# Patient Record
Sex: MALE | Race: WHITE | NOT HISPANIC OR LATINO | Employment: OTHER | URBAN - METROPOLITAN AREA
[De-identification: names, ages, dates, MRNs, and addresses within clinical notes are randomized per-mention and may not be internally consistent; named-entity substitution may affect disease eponyms.]

---

## 2023-02-13 PROBLEM — I35.9 CALCIFICATION OF AORTIC VALVE: Status: ACTIVE | Noted: 2023-02-13

## 2023-02-13 PROBLEM — E11.9 DIABETES MELLITUS TYPE 2, UNCOMPLICATED (MULTI): Status: ACTIVE | Noted: 2023-02-13

## 2023-02-13 PROBLEM — E66.3 OVERWEIGHT WITH BODY MASS INDEX (BMI) OF 29 TO 29.9 IN ADULT: Status: ACTIVE | Noted: 2023-02-13

## 2023-02-13 PROBLEM — Z86.69 HISTORY OF CATARACT: Status: ACTIVE | Noted: 2023-02-13

## 2023-02-13 PROBLEM — I35.0 MILD AORTIC STENOSIS: Status: ACTIVE | Noted: 2023-02-13

## 2023-02-13 PROBLEM — G62.9 NEUROPATHY: Status: ACTIVE | Noted: 2023-02-13

## 2023-02-13 PROBLEM — N18.30 CKD STAGE 3 SECONDARY TO DIABETES (MULTI): Status: ACTIVE | Noted: 2023-02-13

## 2023-02-13 PROBLEM — E13.44: Status: ACTIVE | Noted: 2023-02-13

## 2023-02-13 PROBLEM — I10 BENIGN ESSENTIAL HYPERTENSION: Status: ACTIVE | Noted: 2023-02-13

## 2023-02-13 PROBLEM — E66.811 CLASS 1 OBESITY WITH BODY MASS INDEX (BMI) OF 31.0 TO 31.9 IN ADULT: Status: ACTIVE | Noted: 2023-02-13

## 2023-02-13 PROBLEM — E78.5 HYPERLIPIDEMIA: Status: ACTIVE | Noted: 2023-02-13

## 2023-02-13 PROBLEM — E11.42 DIABETIC SENSORIMOTOR NEUROPATHY (MULTI): Status: ACTIVE | Noted: 2023-02-13

## 2023-02-13 PROBLEM — E11.22 CKD STAGE 3 SECONDARY TO DIABETES (MULTI): Status: ACTIVE | Noted: 2023-02-13

## 2023-02-13 PROBLEM — E66.9 CLASS 1 OBESITY WITH BODY MASS INDEX (BMI) OF 31.0 TO 31.9 IN ADULT: Status: ACTIVE | Noted: 2023-02-13

## 2023-02-13 RX ORDER — AMLODIPINE BESYLATE 10 MG/1
1 TABLET ORAL DAILY
COMMUNITY
Start: 2016-02-19 | End: 2023-03-23 | Stop reason: SDUPTHER

## 2023-02-13 RX ORDER — HYDROCHLOROTHIAZIDE 25 MG/1
1 TABLET ORAL DAILY
COMMUNITY
Start: 2014-05-15 | End: 2023-03-23 | Stop reason: SDUPTHER

## 2023-02-13 RX ORDER — PEN NEEDLE, DIABETIC 30 GX3/16"
NEEDLE, DISPOSABLE MISCELLANEOUS
COMMUNITY

## 2023-02-13 RX ORDER — LOSARTAN POTASSIUM 100 MG/1
1 TABLET ORAL DAILY
COMMUNITY
Start: 2014-05-27 | End: 2023-03-23 | Stop reason: SDUPTHER

## 2023-02-13 RX ORDER — ACETAMINOPHEN 500 MG
1 TABLET ORAL DAILY
COMMUNITY

## 2023-02-13 RX ORDER — TAMSULOSIN HYDROCHLORIDE 0.4 MG/1
CAPSULE ORAL
COMMUNITY
Start: 2020-10-09

## 2023-02-13 RX ORDER — INSULIN GLARGINE 100 [IU]/ML
INJECTION, SOLUTION SUBCUTANEOUS
COMMUNITY
Start: 2014-05-16 | End: 2023-03-23 | Stop reason: SDUPTHER

## 2023-02-13 RX ORDER — ASPIRIN 81 MG/1
1 TABLET ORAL DAILY
COMMUNITY

## 2023-02-13 RX ORDER — BLOOD-GLUCOSE METER
EACH MISCELLANEOUS
COMMUNITY
Start: 2017-11-20 | End: 2023-03-23 | Stop reason: ALTCHOICE

## 2023-02-13 RX ORDER — INSULIN ASPART 100 [IU]/ML
INJECTION, SOLUTION INTRAVENOUS; SUBCUTANEOUS
COMMUNITY
Start: 2015-12-14

## 2023-02-13 RX ORDER — METFORMIN HYDROCHLORIDE 500 MG/1
1 TABLET ORAL
COMMUNITY
Start: 2020-03-12 | End: 2023-03-23 | Stop reason: SDUPTHER

## 2023-02-13 RX ORDER — ATORVASTATIN CALCIUM 40 MG/1
1 TABLET, FILM COATED ORAL NIGHTLY
COMMUNITY
Start: 2015-07-08 | End: 2023-03-23 | Stop reason: SDUPTHER

## 2023-02-13 RX ORDER — ATENOLOL 25 MG/1
1 TABLET ORAL 2 TIMES DAILY
COMMUNITY
Start: 2014-10-29 | End: 2023-03-23 | Stop reason: SDUPTHER

## 2023-03-23 ENCOUNTER — LAB (OUTPATIENT)
Dept: LAB | Facility: LAB | Age: 73
End: 2023-03-23
Payer: MEDICARE

## 2023-03-23 ENCOUNTER — OFFICE VISIT (OUTPATIENT)
Dept: PRIMARY CARE | Facility: CLINIC | Age: 73
End: 2023-03-23
Payer: MEDICARE

## 2023-03-23 VITALS
TEMPERATURE: 98 F | WEIGHT: 208 LBS | OXYGEN SATURATION: 96 % | BODY MASS INDEX: 31.4 KG/M2 | RESPIRATION RATE: 16 BRPM | DIASTOLIC BLOOD PRESSURE: 70 MMHG | SYSTOLIC BLOOD PRESSURE: 128 MMHG | HEART RATE: 72 BPM

## 2023-03-23 DIAGNOSIS — Z79.4 TYPE 2 DIABETES MELLITUS WITHOUT COMPLICATION, WITH LONG-TERM CURRENT USE OF INSULIN (MULTI): Primary | ICD-10-CM

## 2023-03-23 DIAGNOSIS — I35.0 MILD AORTIC STENOSIS: ICD-10-CM

## 2023-03-23 DIAGNOSIS — E11.9 TYPE 2 DIABETES MELLITUS WITHOUT COMPLICATION, WITH LONG-TERM CURRENT USE OF INSULIN (MULTI): Primary | ICD-10-CM

## 2023-03-23 DIAGNOSIS — N40.0 BENIGN PROSTATIC HYPERPLASIA WITHOUT LOWER URINARY TRACT SYMPTOMS: ICD-10-CM

## 2023-03-23 DIAGNOSIS — E11.22 CKD STAGE 3 SECONDARY TO DIABETES (MULTI): ICD-10-CM

## 2023-03-23 DIAGNOSIS — E66.9 CLASS 1 OBESITY WITH SERIOUS COMORBIDITY AND BODY MASS INDEX (BMI) OF 31.0 TO 31.9 IN ADULT, UNSPECIFIED OBESITY TYPE: ICD-10-CM

## 2023-03-23 DIAGNOSIS — E11.9 TYPE 2 DIABETES MELLITUS WITHOUT COMPLICATION, WITH LONG-TERM CURRENT USE OF INSULIN (MULTI): ICD-10-CM

## 2023-03-23 DIAGNOSIS — I35.9 CALCIFICATION OF AORTIC VALVE: ICD-10-CM

## 2023-03-23 DIAGNOSIS — I10 BENIGN ESSENTIAL HYPERTENSION: ICD-10-CM

## 2023-03-23 DIAGNOSIS — E11.42 DIABETIC SENSORIMOTOR NEUROPATHY (MULTI): ICD-10-CM

## 2023-03-23 DIAGNOSIS — N18.30 CKD STAGE 3 SECONDARY TO DIABETES (MULTI): ICD-10-CM

## 2023-03-23 DIAGNOSIS — E78.2 MIXED HYPERLIPIDEMIA: ICD-10-CM

## 2023-03-23 DIAGNOSIS — Z79.4 TYPE 2 DIABETES MELLITUS WITHOUT COMPLICATION, WITH LONG-TERM CURRENT USE OF INSULIN (MULTI): ICD-10-CM

## 2023-03-23 PROBLEM — G62.9 NEUROPATHY: Status: RESOLVED | Noted: 2023-02-13 | Resolved: 2023-03-23

## 2023-03-23 LAB
ABO GROUP (TYPE) IN BLOOD: NORMAL
ANTIBODY SCREEN: NORMAL
CHOLESTEROL (MG/DL) IN SER/PLAS: 123 MG/DL (ref 0–199)
CHOLESTEROL IN HDL (MG/DL) IN SER/PLAS: 43.2 MG/DL
CHOLESTEROL/HDL RATIO: 2.8
LDL: 62 MG/DL (ref 0–99)
PROSTATE SPECIFIC AG (NG/ML) IN SER/PLAS: 0.73 NG/ML (ref 0–4)
RH FACTOR: NORMAL
TRIGLYCERIDE (MG/DL) IN SER/PLAS: 87 MG/DL (ref 0–149)
VLDL: 17 MG/DL (ref 0–40)

## 2023-03-23 PROCEDURE — 99214 OFFICE O/P EST MOD 30 MIN: CPT | Performed by: INTERNAL MEDICINE

## 2023-03-23 PROCEDURE — 36415 COLL VENOUS BLD VENIPUNCTURE: CPT

## 2023-03-23 PROCEDURE — 4010F ACE/ARB THERAPY RXD/TAKEN: CPT | Performed by: INTERNAL MEDICINE

## 2023-03-23 PROCEDURE — 3008F BODY MASS INDEX DOCD: CPT | Performed by: INTERNAL MEDICINE

## 2023-03-23 PROCEDURE — 3066F NEPHROPATHY DOC TX: CPT | Performed by: INTERNAL MEDICINE

## 2023-03-23 PROCEDURE — 3074F SYST BP LT 130 MM HG: CPT | Performed by: INTERNAL MEDICINE

## 2023-03-23 PROCEDURE — 1036F TOBACCO NON-USER: CPT | Performed by: INTERNAL MEDICINE

## 2023-03-23 PROCEDURE — 84153 ASSAY OF PSA TOTAL: CPT

## 2023-03-23 PROCEDURE — 3078F DIAST BP <80 MM HG: CPT | Performed by: INTERNAL MEDICINE

## 2023-03-23 PROCEDURE — 80061 LIPID PANEL: CPT

## 2023-03-23 PROCEDURE — 83036 HEMOGLOBIN GLYCOSYLATED A1C: CPT | Performed by: INTERNAL MEDICINE

## 2023-03-23 PROCEDURE — 1159F MED LIST DOCD IN RCRD: CPT | Performed by: INTERNAL MEDICINE

## 2023-03-23 RX ORDER — METFORMIN HYDROCHLORIDE 500 MG/1
500 TABLET ORAL
Qty: 90 TABLET | Refills: 3 | Status: SHIPPED | OUTPATIENT
Start: 2023-03-23 | End: 2023-09-27 | Stop reason: ALTCHOICE

## 2023-03-23 RX ORDER — ATENOLOL 25 MG/1
25 TABLET ORAL 2 TIMES DAILY
Qty: 180 TABLET | Refills: 2 | Status: SHIPPED | OUTPATIENT
Start: 2023-03-23

## 2023-03-23 RX ORDER — AMLODIPINE BESYLATE 10 MG/1
10 TABLET ORAL DAILY
Qty: 90 TABLET | Refills: 3 | Status: SHIPPED | OUTPATIENT
Start: 2023-03-23

## 2023-03-23 RX ORDER — INSULIN GLARGINE 100 [IU]/ML
INJECTION, SOLUTION SUBCUTANEOUS
Qty: 3 ML | Refills: 2 | Status: SHIPPED | OUTPATIENT
Start: 2023-03-23

## 2023-03-23 RX ORDER — ATORVASTATIN CALCIUM 40 MG/1
40 TABLET, FILM COATED ORAL NIGHTLY
Qty: 90 TABLET | Refills: 3 | Status: SHIPPED | OUTPATIENT
Start: 2023-03-23

## 2023-03-23 RX ORDER — HYDROCHLOROTHIAZIDE 25 MG/1
25 TABLET ORAL DAILY
Qty: 90 TABLET | Refills: 3 | Status: SHIPPED | OUTPATIENT
Start: 2023-03-23 | End: 2024-04-03 | Stop reason: SDUPTHER

## 2023-03-23 RX ORDER — LOSARTAN POTASSIUM 100 MG/1
100 TABLET ORAL DAILY
Qty: 90 TABLET | Refills: 3 | Status: SHIPPED | OUTPATIENT
Start: 2023-03-23

## 2023-03-23 RX ORDER — BLOOD-GLUCOSE METER
1 EACH MISCELLANEOUS 3 TIMES DAILY
Qty: 270 STRIP | Refills: 3 | Status: SHIPPED | OUTPATIENT
Start: 2023-03-23 | End: 2023-03-23 | Stop reason: SDUPTHER

## 2023-03-23 RX ORDER — ALLOPURINOL 100 MG/1
100 TABLET ORAL DAILY
COMMUNITY

## 2023-03-23 ASSESSMENT — ENCOUNTER SYMPTOMS
BLOOD IN STOOL: 0
DIZZINESS: 0
LIGHT-HEADEDNESS: 0
DIARRHEA: 0
SHORTNESS OF BREATH: 0
CONSTIPATION: 0
NUMBNESS: 1

## 2023-03-23 ASSESSMENT — PATIENT HEALTH QUESTIONNAIRE - PHQ9
1. LITTLE INTEREST OR PLEASURE IN DOING THINGS: NOT AT ALL
SUM OF ALL RESPONSES TO PHQ9 QUESTIONS 1 AND 2: 0
2. FEELING DOWN, DEPRESSED OR HOPELESS: NOT AT ALL

## 2023-03-23 NOTE — PROGRESS NOTES
"Patient here for a follow up    Subjective   Patient ID: Shiva Hanson is a 72 y.o. male who presents for Follow-up.  He is generally doing well today.    The patient has been measuring his blood glucose at home and reports that average readings are within the normal range.  He has been working on limiting his carbohydrate intake and feels that this has been helping.  He has an upcoming appointment with  in 8/2023 to establish care.  There have been no changes in his medications.  He plans to complete his regular eye exam on 3/30/2023.      The patient mentions intermittent occasional lower limb peripheral neuropathy which he describes as a \"phantom pain\" in varying areas of t foot that subsides.    The patient reports a long-standing heart murmur since childhood, and inquires if this may be related to his being born premature.  His last echocardiogram in 1/2023 showed normal LVSF with EF 60-65%, moderate aortic valve stenosis, and moderate aortic valve cusp calcification. He denies any recent dizziness, lightheadedness, or dyspnea.    The patient recently completed a renal ultrasound as part of a chronic kidney disease work-up which found elevated postvoid bladder residual of 80 mL and slight wall trabeculation suggestive of bladder outlet obstruction.  He was started on tamsulosin 0.4 mg at bedtime and has not yet noticed a difference in his symptomlogy.  He continues to wake up about two times per night for mild nocturia.  His latest renal function panel was improved overall with a GFR of 45 in 1/2023.  He was also placed on allopurinol temporarily and is responding to the medication well.  The patient continues to follow with  from Nephrology and will have his next appointment in 8/2023.     The patient denies any abdominal pain, hematochezia, melena, or bowel problems.      Review of Systems   Respiratory:  Negative for shortness of breath.    Gastrointestinal:  Negative for blood in stool, " constipation and diarrhea.   Genitourinary:         Positive for mild nocturia of two times per night.   Neurological:  Positive for numbness. Negative for dizziness and light-headedness.        Positive for lower limb peripheral neuropathy.       Objective   Physical Exam  Constitutional:       Appearance: Normal appearance.   Cardiovascular:      Rate and Rhythm: Normal rate and regular rhythm.      Heart sounds: Murmur heard.   Pulmonary:      Effort: Pulmonary effort is normal.      Breath sounds: Normal breath sounds.   Abdominal:      General: Bowel sounds are normal.      Palpations: Abdomen is soft.      Tenderness: There is no abdominal tenderness.   Skin:     General: Skin is warm and dry.   Neurological:      General: No focal deficit present.      Mental Status: He is alert and oriented to person, place, and time. Mental status is at baseline.   Psychiatric:         Mood and Affect: Mood normal.         Behavior: Behavior normal.       Assessment/Plan       IMPRESSION/PLAN:      HTN   - BP is 128/70 in office today. Currently maintained on Losartan 100mg QD, atenolol 25mg BID, HCTZ 25mg QD, and amlodipine 10mg QD.      HLD   - Stable, continue on atorvastatin 40mg QD.     DM II   - Last a1c 8.5% - 3/2023, previously following with Dr. Lee in Endocrinology, maintained on metformin 500mg BID, Jardiance 10mg QD, NovoLog FlexPen 100 unit/mL 6 units with meals + sliding scale, and Lantus insulin 100 unit/mL 14 units QHS. Upcoming appointment with  in 8/2023.     Aortic Valve Calcification   - Last echocardiogram 7/2018, continue to monitor, takes ASA 81mg QD. Repeat Echo 1/2023 showed normal LVSF with EF 60-65%, moderate aortic valve stenosis, and moderate aortic valve cusp calcification.  Discussed pathophysiology and implications with patient and advised he call the clinic if he experiences lightheadedness, dizziness, or dyspnea.  Offered referral to Cardiology, but patient would like to hold  off for now.     CKD III   - Following with Dr. Kiser in Nephrology, BUN elevated at 38 per 1/2023 RFP and creatinine elevated at 1.60 with GFR decreased at 45. Continue with allopurinol 100mg every day.     BPH   - Last PSA in normal range - 2/2022, maintained on tamsulosin 0.4mg QD.  Ordered PSA.  Continue with tamsulosin 0.4mg every day.     Vitamin D Deficiency   - Takes Vitamin D3 125mcg QD.      Health Maintenance   - Routine labs ordered including lipid panel to be completed in the fasting state.  Added PSA, Blood type and screen per patient request. Last PSA wnl 2/2022. Cologuard negative 7/2021, due for repeat 2024.     Follow up in 6 months, call sooner if needed.        Scribe Attestation  By signing my name below, IDanii Scribe   attest that this documentation has been prepared under the direction and in the presence of Med Zhou DO.

## 2023-03-27 RX ORDER — BLOOD-GLUCOSE METER
1 EACH MISCELLANEOUS 3 TIMES DAILY
Qty: 300 STRIP | Refills: 3 | Status: SHIPPED | OUTPATIENT
Start: 2023-03-27 | End: 2024-02-08 | Stop reason: SDUPTHER

## 2023-07-29 LAB
ALBUMIN (G/DL) IN SER/PLAS: 3.9 G/DL (ref 3.4–5)
ALBUMIN (MG/L) IN URINE: 121 MG/L
ALBUMIN/CREATININE (UG/MG) IN URINE: 309.5 UG/MG CRT (ref 0–30)
ANION GAP IN SER/PLAS: 14 MMOL/L (ref 10–20)
BASOPHILS (10*3/UL) IN BLOOD BY AUTOMATED COUNT: 0.02 X10E9/L (ref 0–0.1)
BASOPHILS/100 LEUKOCYTES IN BLOOD BY AUTOMATED COUNT: 0.3 % (ref 0–2)
CALCIUM (MG/DL) IN SER/PLAS: 9.2 MG/DL (ref 8.6–10.3)
CARBON DIOXIDE, TOTAL (MMOL/L) IN SER/PLAS: 25 MMOL/L (ref 21–32)
CHLORIDE (MMOL/L) IN SER/PLAS: 101 MMOL/L (ref 98–107)
CREATININE (MG/DL) IN SER/PLAS: 1.91 MG/DL (ref 0.5–1.3)
CREATININE (MG/DL) IN URINE: 39.1 MG/DL (ref 20–370)
EOSINOPHILS (10*3/UL) IN BLOOD BY AUTOMATED COUNT: 0.24 X10E9/L (ref 0–0.4)
EOSINOPHILS/100 LEUKOCYTES IN BLOOD BY AUTOMATED COUNT: 3.1 % (ref 0–6)
ERYTHROCYTE DISTRIBUTION WIDTH (RATIO) BY AUTOMATED COUNT: 13.2 % (ref 11.5–14.5)
ERYTHROCYTE MEAN CORPUSCULAR HEMOGLOBIN CONCENTRATION (G/DL) BY AUTOMATED: 32 G/DL (ref 32–36)
ERYTHROCYTE MEAN CORPUSCULAR VOLUME (FL) BY AUTOMATED COUNT: 96 FL (ref 80–100)
ERYTHROCYTES (10*6/UL) IN BLOOD BY AUTOMATED COUNT: 4.7 X10E12/L (ref 4.5–5.9)
GFR MALE: 37 ML/MIN/1.73M2
GLUCOSE (MG/DL) IN SER/PLAS: 148 MG/DL (ref 74–99)
HEMATOCRIT (%) IN BLOOD BY AUTOMATED COUNT: 45.3 % (ref 41–52)
HEMOGLOBIN (G/DL) IN BLOOD: 14.5 G/DL (ref 13.5–17.5)
IMMATURE GRANULOCYTES/100 LEUKOCYTES IN BLOOD BY AUTOMATED COUNT: 0.4 % (ref 0–0.9)
LEUKOCYTES (10*3/UL) IN BLOOD BY AUTOMATED COUNT: 7.6 X10E9/L (ref 4.4–11.3)
LYMPHOCYTES (10*3/UL) IN BLOOD BY AUTOMATED COUNT: 1.67 X10E9/L (ref 0.8–3)
LYMPHOCYTES/100 LEUKOCYTES IN BLOOD BY AUTOMATED COUNT: 21.9 % (ref 13–44)
MONOCYTES (10*3/UL) IN BLOOD BY AUTOMATED COUNT: 0.44 X10E9/L (ref 0.05–0.8)
MONOCYTES/100 LEUKOCYTES IN BLOOD BY AUTOMATED COUNT: 5.8 % (ref 2–10)
NEUTROPHILS (10*3/UL) IN BLOOD BY AUTOMATED COUNT: 5.23 X10E9/L (ref 1.6–5.5)
NEUTROPHILS/100 LEUKOCYTES IN BLOOD BY AUTOMATED COUNT: 68.5 % (ref 40–80)
PHOSPHATE (MG/DL) IN SER/PLAS: 3.3 MG/DL (ref 2.5–4.9)
PLATELETS (10*3/UL) IN BLOOD AUTOMATED COUNT: 218 X10E9/L (ref 150–450)
POTASSIUM (MMOL/L) IN SER/PLAS: 4.1 MMOL/L (ref 3.5–5.3)
SODIUM (MMOL/L) IN SER/PLAS: 136 MMOL/L (ref 136–145)
URATE (MG/DL) IN SER/PLAS: 6.8 MG/DL (ref 4–7.5)
UREA NITROGEN (MG/DL) IN SER/PLAS: 26 MG/DL (ref 6–23)

## 2023-08-07 LAB — HEMOGLOBIN A1C/HEMOGLOBIN TOTAL IN BLOOD: 7.2 %

## 2023-09-27 ENCOUNTER — OFFICE VISIT (OUTPATIENT)
Dept: PRIMARY CARE | Facility: CLINIC | Age: 73
End: 2023-09-27
Payer: MEDICARE

## 2023-09-27 VITALS
WEIGHT: 196 LBS | SYSTOLIC BLOOD PRESSURE: 128 MMHG | BODY MASS INDEX: 29.58 KG/M2 | HEART RATE: 68 BPM | OXYGEN SATURATION: 97 % | RESPIRATION RATE: 16 BRPM | DIASTOLIC BLOOD PRESSURE: 70 MMHG

## 2023-09-27 DIAGNOSIS — N18.30 CKD STAGE 3 SECONDARY TO DIABETES (MULTI): ICD-10-CM

## 2023-09-27 DIAGNOSIS — Z23 ENCOUNTER FOR IMMUNIZATION: Primary | ICD-10-CM

## 2023-09-27 DIAGNOSIS — I35.9 CALCIFICATION OF AORTIC VALVE: ICD-10-CM

## 2023-09-27 DIAGNOSIS — E11.22 CKD STAGE 3 SECONDARY TO DIABETES (MULTI): ICD-10-CM

## 2023-09-27 DIAGNOSIS — I10 BENIGN ESSENTIAL HYPERTENSION: ICD-10-CM

## 2023-09-27 DIAGNOSIS — E11.9 TYPE 2 DIABETES MELLITUS WITHOUT COMPLICATION, WITHOUT LONG-TERM CURRENT USE OF INSULIN (MULTI): ICD-10-CM

## 2023-09-27 PROCEDURE — 90662 IIV NO PRSV INCREASED AG IM: CPT | Performed by: INTERNAL MEDICINE

## 2023-09-27 PROCEDURE — 3008F BODY MASS INDEX DOCD: CPT | Performed by: INTERNAL MEDICINE

## 2023-09-27 PROCEDURE — 3074F SYST BP LT 130 MM HG: CPT | Performed by: INTERNAL MEDICINE

## 2023-09-27 PROCEDURE — 4010F ACE/ARB THERAPY RXD/TAKEN: CPT | Performed by: INTERNAL MEDICINE

## 2023-09-27 PROCEDURE — 1160F RVW MEDS BY RX/DR IN RCRD: CPT | Performed by: INTERNAL MEDICINE

## 2023-09-27 PROCEDURE — G0008 ADMIN INFLUENZA VIRUS VAC: HCPCS | Performed by: INTERNAL MEDICINE

## 2023-09-27 PROCEDURE — 99214 OFFICE O/P EST MOD 30 MIN: CPT | Performed by: INTERNAL MEDICINE

## 2023-09-27 PROCEDURE — 3078F DIAST BP <80 MM HG: CPT | Performed by: INTERNAL MEDICINE

## 2023-09-27 PROCEDURE — 1036F TOBACCO NON-USER: CPT | Performed by: INTERNAL MEDICINE

## 2023-09-27 PROCEDURE — 3066F NEPHROPATHY DOC TX: CPT | Performed by: INTERNAL MEDICINE

## 2023-09-27 PROCEDURE — 1159F MED LIST DOCD IN RCRD: CPT | Performed by: INTERNAL MEDICINE

## 2023-09-27 RX ORDER — SEMAGLUTIDE 1.34 MG/ML
INJECTION, SOLUTION SUBCUTANEOUS
COMMUNITY

## 2023-09-27 ASSESSMENT — ENCOUNTER SYMPTOMS: SHORTNESS OF BREATH: 0

## 2023-09-27 NOTE — PROGRESS NOTES
Patient here for a 6 month follow up    Subjective   Patient ID: Shiva Hanson is a 73 y.o. male who presents for Follow-up.    The patient recently established with  from Endocrinology, and is pleased with the care he received.  He was started on semaglutide (Ozempic) 2mg/1.5ml as 0.25mg once weekly.  He is no longer able to eat peanut butter while on the medication due to an adverse reaction, and has started a probiotic which is helping.  Overall, he is tolerating the Ozempic relatively well.  He is also maintained with Jardiance 10mg QD, NovoLog FlexPen 100 unit/mL 6 units with meals + sliding scale, and Lantus insulin 100 unit/mL 12 units at bedtime.  The patient's last HbA1c was 7.2% in 8/7/2023 at 's office.      The patient denies any dyspnea, chest pain, or chest pressure.  His last echocardiogram in 1/2023 showed an EF 60-65%, moderate aortic valve stenosis, and moderate aortic valve cusp calcification.      Review of Systems   Respiratory:  Negative for shortness of breath.    Cardiovascular:  Negative for chest pain.     Objective   Physical Exam  Constitutional:       Appearance: Normal appearance.   Neck:      Vascular: No carotid bruit.   Cardiovascular:      Rate and Rhythm: Normal rate and regular rhythm.      Heart sounds: Normal heart sounds.   Pulmonary:      Effort: Pulmonary effort is normal.      Breath sounds: Normal breath sounds.   Abdominal:      General: Bowel sounds are normal.      Palpations: Abdomen is soft.      Tenderness: There is no abdominal tenderness.   Skin:     General: Skin is warm and dry.   Neurological:      General: No focal deficit present.      Mental Status: He is alert and oriented to person, place, and time. Mental status is at baseline.   Psychiatric:         Mood and Affect: Mood normal.         Behavior: Behavior normal.         Assessment/Plan   Problem List Items Addressed This Visit             ICD-10-CM    Benign essential hypertension  I10    Calcification of aortic valve I35.9    CKD stage 3 secondary to diabetes (CMS/HCC) E11.22, N18.30    Diabetes mellitus type 2, uncomplicated (CMS/HCC) E11.9     Other Visit Diagnoses         Codes    Encounter for immunization    -  Primary Z23    Relevant Orders    Flu vaccine, quadrivalent, high-dose, preservative free, age 65y+ (FLUZONE) (Completed)            IMPRESSION/PLAN:      HTN   - /70 in office today. Currently maintained on Losartan 100mg QD, atenolol 25mg BID, HCTZ 25mg QD, and amlodipine 10mg QD.      HLD   - Stable, continue on atorvastatin 40mg QD.    DM II   - Last a1c 7.2% - 8/7/2023.  Maintained on semaglutide (Ozempic) 2mg/1.5ml as 0.25mg once weekly, Jardiance 10mg QD, NovoLog FlexPen 100 unit/mL 6 units with meals + sliding scale, and Lantus insulin 100 unit/mL 12 units QHS. Previously on metformin 500mg BID.  Following with  in 8/2023.      Aortic Valve Calcification   - Last echocardiogram 7/2018, continue to monitor, takes ASA 81mg QD. Repeat Echo 1/2023 showed normal LVSF with EF 60-65%, moderate aortic valve stenosis, and moderate aortic valve cusp calcification.  Discussed pathophysiology and implications with patient and advised he call the clinic if he experiences lightheadedness, dizziness, or dyspnea.  Offered referral to Cardiology, but patient would like to hold off for now.     CKD III   - Following with Dr. Kiser in Nephrology, BUN elevated at 26 per 7/2023 RFP and creatinine elevated at 1.91 with GFR decreased at 37. Continue with allopurinol 100mg every day.     BPH   - Last PSA in normal range - 3/2023.  Continue with tamsulosin 0.4mg every day.     Vitamin D Deficiency   - Takes Vitamin D3 125mcg QD.      Health Maintenance   - Routine labs 7/2023. Blood type O positive. Last PSA wnl 3/2023. Cologuard negative 7/2021, due for repeat 2024. Patient received high-dose Influenza vaccine in the clinic today, tolerated well.      Follow up in 6 months, call  sooner if needed.        Scribe Attestation  By signing my name below, I, Danii Michaud, Scribcasimiro   attest that this documentation has been prepared under the direction and in the presence of Mde Zhou DO.

## 2023-11-27 ENCOUNTER — APPOINTMENT (OUTPATIENT)
Dept: ENDOCRINOLOGY | Facility: CLINIC | Age: 73
End: 2023-11-27
Payer: MEDICARE

## 2024-02-08 ENCOUNTER — OFFICE VISIT (OUTPATIENT)
Dept: ENDOCRINOLOGY | Facility: CLINIC | Age: 74
End: 2024-02-08
Payer: MEDICARE

## 2024-02-08 VITALS
WEIGHT: 205 LBS | SYSTOLIC BLOOD PRESSURE: 161 MMHG | DIASTOLIC BLOOD PRESSURE: 71 MMHG | BODY MASS INDEX: 31.07 KG/M2 | HEIGHT: 68 IN

## 2024-02-08 DIAGNOSIS — Z79.4 TYPE 2 DIABETES MELLITUS WITHOUT COMPLICATION, WITH LONG-TERM CURRENT USE OF INSULIN (MULTI): ICD-10-CM

## 2024-02-08 DIAGNOSIS — I10 BENIGN ESSENTIAL HYPERTENSION: Primary | ICD-10-CM

## 2024-02-08 DIAGNOSIS — E11.22 CKD STAGE 3 SECONDARY TO DIABETES (MULTI): ICD-10-CM

## 2024-02-08 DIAGNOSIS — E11.9 TYPE 2 DIABETES MELLITUS WITHOUT COMPLICATION, WITH LONG-TERM CURRENT USE OF INSULIN (MULTI): ICD-10-CM

## 2024-02-08 DIAGNOSIS — N18.30 CKD STAGE 3 SECONDARY TO DIABETES (MULTI): ICD-10-CM

## 2024-02-08 LAB
POC FINGERSTICK BLOOD GLUCOSE: 118 MG/DL (ref 70–100)
POC HEMOGLOBIN A1C: 7.1 % (ref 4.2–6.5)

## 2024-02-08 PROCEDURE — 3077F SYST BP >= 140 MM HG: CPT | Performed by: STUDENT IN AN ORGANIZED HEALTH CARE EDUCATION/TRAINING PROGRAM

## 2024-02-08 PROCEDURE — 82962 GLUCOSE BLOOD TEST: CPT | Performed by: STUDENT IN AN ORGANIZED HEALTH CARE EDUCATION/TRAINING PROGRAM

## 2024-02-08 PROCEDURE — 83036 HEMOGLOBIN GLYCOSYLATED A1C: CPT | Performed by: STUDENT IN AN ORGANIZED HEALTH CARE EDUCATION/TRAINING PROGRAM

## 2024-02-08 PROCEDURE — 99214 OFFICE O/P EST MOD 30 MIN: CPT | Performed by: STUDENT IN AN ORGANIZED HEALTH CARE EDUCATION/TRAINING PROGRAM

## 2024-02-08 PROCEDURE — 1159F MED LIST DOCD IN RCRD: CPT | Performed by: STUDENT IN AN ORGANIZED HEALTH CARE EDUCATION/TRAINING PROGRAM

## 2024-02-08 PROCEDURE — 1036F TOBACCO NON-USER: CPT | Performed by: STUDENT IN AN ORGANIZED HEALTH CARE EDUCATION/TRAINING PROGRAM

## 2024-02-08 PROCEDURE — 3078F DIAST BP <80 MM HG: CPT | Performed by: STUDENT IN AN ORGANIZED HEALTH CARE EDUCATION/TRAINING PROGRAM

## 2024-02-08 PROCEDURE — 4010F ACE/ARB THERAPY RXD/TAKEN: CPT | Performed by: STUDENT IN AN ORGANIZED HEALTH CARE EDUCATION/TRAINING PROGRAM

## 2024-02-08 PROCEDURE — 3008F BODY MASS INDEX DOCD: CPT | Performed by: STUDENT IN AN ORGANIZED HEALTH CARE EDUCATION/TRAINING PROGRAM

## 2024-02-08 RX ORDER — BLOOD-GLUCOSE METER
1 EACH MISCELLANEOUS 3 TIMES DAILY
Qty: 300 STRIP | Refills: 3 | Status: SHIPPED | OUTPATIENT
Start: 2024-02-08

## 2024-02-08 ASSESSMENT — ENCOUNTER SYMPTOMS: CONSTITUTIONAL NEGATIVE: 1

## 2024-02-08 NOTE — PROGRESS NOTES
"Subjective   Patient ID: Shiva Hanson is a 73 y.o. male who presents for Diabetes (Dx dm: >15 years /PCP: [facility] /Podiatry: does not see one /Eye exam: yearly; 3/2023/Patient testing glucose 3 times daily; forgot log at home. /Last hga1c 8/7/23 7.2%/Did trial of ozmepic- took on Sunday; but then had like acid reflux about 1:30pm for days after- unsure if black coffee related or not-- would like to discuss; glucose readings were about the same when was on ozempic trial ).  Lab Results   Component Value Date    HGBA1C 7.1 (A) 02/08/2024      HPI  A 73 yr old male, with DM, CKD, presented for follow up   He is doing well , no new concerns     History :  he previously followed with Dr. smallwood   currently on :  Lantus 14 units   NovoLog sliding scale ( uses only 1-2 times a week )   Jardiance 10 mg daily, and Metformin 500 mg BID    Patient has lost about 60 Ib in the past year.            Review of Systems   Constitutional: Negative.        Objective   Physical Exam  Cardiovascular:      Rate and Rhythm: Normal rate and regular rhythm.   Pulmonary:      Effort: Pulmonary effort is normal.      Breath sounds: Normal breath sounds.   Musculoskeletal:      Right lower leg: Edema present.   Neurological:      General: No focal deficit present.   Psychiatric:         Mood and Affect: Mood normal.      Visit Vitals  /71   Ht 1.727 m (5' 8\")   Wt 93 kg (205 lb)   BMI 31.17 kg/m²   Smoking Status Never   BSA 2.11 m²        Assessment/Plan        -relatively controlled DM2 with hba1c of 7.q on MDI and oral agents   -CKD 3 following with nephrologist Dr. Kiser   -mild non proliferative Drm retinopathy , follows with opthalmology  -No DM nephropathy , does not follow with podiatry   - HTN uncontrolled today, he will keep Bp log and report to his pcp or nephrologist if BP remains elevated         Plan:   -stop Metformin  -Start Ozemipic 0.25 mg for 1 week then 0.5 mg weekly ( did well with office sample , except for " indigestion)  -Decrease Lantus to 12 units daily  -Continue NovoLog sliding scale  -Continue Jardiance 10 mg daily     -RTC in 3 months

## 2024-03-27 ENCOUNTER — APPOINTMENT (OUTPATIENT)
Dept: PRIMARY CARE | Facility: CLINIC | Age: 74
End: 2024-03-27
Payer: MEDICARE

## 2024-04-03 ENCOUNTER — LAB (OUTPATIENT)
Dept: LAB | Facility: LAB | Age: 74
End: 2024-04-03
Payer: MEDICARE

## 2024-04-03 ENCOUNTER — OFFICE VISIT (OUTPATIENT)
Dept: PRIMARY CARE | Facility: CLINIC | Age: 74
End: 2024-04-03
Payer: MEDICARE

## 2024-04-03 VITALS
SYSTOLIC BLOOD PRESSURE: 130 MMHG | TEMPERATURE: 97.4 F | OXYGEN SATURATION: 100 % | RESPIRATION RATE: 16 BRPM | HEIGHT: 68 IN | BODY MASS INDEX: 29.7 KG/M2 | WEIGHT: 196 LBS | DIASTOLIC BLOOD PRESSURE: 70 MMHG | HEART RATE: 63 BPM

## 2024-04-03 DIAGNOSIS — E11.22 CKD STAGE 3 SECONDARY TO DIABETES (MULTI): ICD-10-CM

## 2024-04-03 DIAGNOSIS — Z79.4 TYPE 2 DIABETES MELLITUS WITHOUT COMPLICATION, WITH LONG-TERM CURRENT USE OF INSULIN (MULTI): ICD-10-CM

## 2024-04-03 DIAGNOSIS — Z12.11 COLON CANCER SCREENING: Primary | ICD-10-CM

## 2024-04-03 DIAGNOSIS — E11.9 TYPE 2 DIABETES MELLITUS WITHOUT COMPLICATION, WITH LONG-TERM CURRENT USE OF INSULIN (MULTI): ICD-10-CM

## 2024-04-03 DIAGNOSIS — Z12.5 PROSTATE CANCER SCREENING: ICD-10-CM

## 2024-04-03 DIAGNOSIS — N18.30 CKD STAGE 3 SECONDARY TO DIABETES (MULTI): ICD-10-CM

## 2024-04-03 LAB
ALBUMIN SERPL BCP-MCNC: 3.9 G/DL (ref 3.4–5)
ALP SERPL-CCNC: 89 U/L (ref 33–136)
ALT SERPL W P-5'-P-CCNC: 13 U/L (ref 10–52)
ANION GAP SERPL CALC-SCNC: 14 MMOL/L (ref 10–20)
AST SERPL W P-5'-P-CCNC: 14 U/L (ref 9–39)
BASOPHILS # BLD AUTO: 0.02 X10*3/UL (ref 0–0.1)
BASOPHILS NFR BLD AUTO: 0.2 %
BILIRUB SERPL-MCNC: 0.7 MG/DL (ref 0–1.2)
BUN SERPL-MCNC: 39 MG/DL (ref 6–23)
CALCIUM SERPL-MCNC: 9.5 MG/DL (ref 8.6–10.6)
CHLORIDE SERPL-SCNC: 105 MMOL/L (ref 98–107)
CHOLEST SERPL-MCNC: 111 MG/DL (ref 0–199)
CHOLESTEROL/HDL RATIO: 2.5
CO2 SERPL-SCNC: 26 MMOL/L (ref 21–32)
CREAT SERPL-MCNC: 1.97 MG/DL (ref 0.5–1.3)
EGFRCR SERPLBLD CKD-EPI 2021: 35 ML/MIN/1.73M*2
EOSINOPHIL # BLD AUTO: 0.1 X10*3/UL (ref 0–0.4)
EOSINOPHIL NFR BLD AUTO: 1 %
ERYTHROCYTE [DISTWIDTH] IN BLOOD BY AUTOMATED COUNT: 13.2 % (ref 11.5–14.5)
GLUCOSE SERPL-MCNC: 104 MG/DL (ref 74–99)
HCT VFR BLD AUTO: 50.2 % (ref 41–52)
HDLC SERPL-MCNC: 43.9 MG/DL
HGB BLD-MCNC: 16.5 G/DL (ref 13.5–17.5)
IMM GRANULOCYTES # BLD AUTO: 0.04 X10*3/UL (ref 0–0.5)
IMM GRANULOCYTES NFR BLD AUTO: 0.4 % (ref 0–0.9)
LDLC SERPL CALC-MCNC: 57 MG/DL
LYMPHOCYTES # BLD AUTO: 1.59 X10*3/UL (ref 0.8–3)
LYMPHOCYTES NFR BLD AUTO: 15.1 %
MCH RBC QN AUTO: 30.7 PG (ref 26–34)
MCHC RBC AUTO-ENTMCNC: 32.9 G/DL (ref 32–36)
MCV RBC AUTO: 93 FL (ref 80–100)
MONOCYTES # BLD AUTO: 0.68 X10*3/UL (ref 0.05–0.8)
MONOCYTES NFR BLD AUTO: 6.5 %
NEUTROPHILS # BLD AUTO: 8.08 X10*3/UL (ref 1.6–5.5)
NEUTROPHILS NFR BLD AUTO: 76.8 %
NON HDL CHOLESTEROL: 67 MG/DL (ref 0–149)
NRBC BLD-RTO: 0 /100 WBCS (ref 0–0)
PLATELET # BLD AUTO: 218 X10*3/UL (ref 150–450)
POTASSIUM SERPL-SCNC: 4.1 MMOL/L (ref 3.5–5.3)
PROT SERPL-MCNC: 6.9 G/DL (ref 6.4–8.2)
PSA SERPL-MCNC: 0.63 NG/ML
RBC # BLD AUTO: 5.38 X10*6/UL (ref 4.5–5.9)
SODIUM SERPL-SCNC: 141 MMOL/L (ref 136–145)
TRIGL SERPL-MCNC: 50 MG/DL (ref 0–149)
URATE SERPL-MCNC: 6.1 MG/DL (ref 4–7.5)
VLDL: 10 MG/DL (ref 0–40)
WBC # BLD AUTO: 10.5 X10*3/UL (ref 4.4–11.3)

## 2024-04-03 PROCEDURE — 1170F FXNL STATUS ASSESSED: CPT | Performed by: INTERNAL MEDICINE

## 2024-04-03 PROCEDURE — 1160F RVW MEDS BY RX/DR IN RCRD: CPT | Performed by: INTERNAL MEDICINE

## 2024-04-03 PROCEDURE — 3078F DIAST BP <80 MM HG: CPT | Performed by: INTERNAL MEDICINE

## 2024-04-03 PROCEDURE — 4010F ACE/ARB THERAPY RXD/TAKEN: CPT | Performed by: INTERNAL MEDICINE

## 2024-04-03 PROCEDURE — G0103 PSA SCREENING: HCPCS

## 2024-04-03 PROCEDURE — 3075F SYST BP GE 130 - 139MM HG: CPT | Performed by: INTERNAL MEDICINE

## 2024-04-03 PROCEDURE — 1159F MED LIST DOCD IN RCRD: CPT | Performed by: INTERNAL MEDICINE

## 2024-04-03 PROCEDURE — 80061 LIPID PANEL: CPT

## 2024-04-03 PROCEDURE — 84550 ASSAY OF BLOOD/URIC ACID: CPT

## 2024-04-03 PROCEDURE — 36415 COLL VENOUS BLD VENIPUNCTURE: CPT

## 2024-04-03 PROCEDURE — 1036F TOBACCO NON-USER: CPT | Performed by: INTERNAL MEDICINE

## 2024-04-03 PROCEDURE — 80053 COMPREHEN METABOLIC PANEL: CPT

## 2024-04-03 PROCEDURE — G0439 PPPS, SUBSEQ VISIT: HCPCS | Performed by: INTERNAL MEDICINE

## 2024-04-03 PROCEDURE — 85025 COMPLETE CBC W/AUTO DIFF WBC: CPT

## 2024-04-03 PROCEDURE — 99214 OFFICE O/P EST MOD 30 MIN: CPT | Performed by: INTERNAL MEDICINE

## 2024-04-03 RX ORDER — HYDROCHLOROTHIAZIDE 25 MG/1
25 TABLET ORAL DAILY
Qty: 90 TABLET | Refills: 3 | Status: SHIPPED | OUTPATIENT
Start: 2024-04-03

## 2024-04-03 ASSESSMENT — ACTIVITIES OF DAILY LIVING (ADL)
BATHING: INDEPENDENT
DRESSING: INDEPENDENT
TAKING_MEDICATION: INDEPENDENT
MANAGING_FINANCES: INDEPENDENT
DOING_HOUSEWORK: INDEPENDENT
GROCERY_SHOPPING: INDEPENDENT

## 2024-04-03 ASSESSMENT — ENCOUNTER SYMPTOMS
ABDOMINAL DISTENTION: 1
CONSTIPATION: 1
ABDOMINAL PAIN: 0
ROS GI COMMENTS: POSITIVE FOR INDIGESTION.
DIARRHEA: 0

## 2024-04-03 ASSESSMENT — PATIENT HEALTH QUESTIONNAIRE - PHQ9
1. LITTLE INTEREST OR PLEASURE IN DOING THINGS: NOT AT ALL
2. FEELING DOWN, DEPRESSED OR HOPELESS: NOT AT ALL
SUM OF ALL RESPONSES TO PHQ9 QUESTIONS 1 AND 2: 0

## 2024-04-03 NOTE — PROGRESS NOTES
Patient here for a medicare wellness visit and follow up    Subjective   Patient ID: Shiva Hanson is a 73 y.o. male who presents for Medicare Annual Wellness Visit Subsequent and Follow-up.    The patient is currently taking semaglutide (Ozempic) 2mg/1.5ml as 0.5mg once weekly, and is still experiencing gastrointestinal symptoms including indigestion and constipation.  He is managing symptoms with stool softeners, which are helping.  He endorses occasional abdominal distension, but denies any abdominal pain. The patient continues to follow with  from Endocrinology every three months, and his last HbA1c was 7.1% in 2/2024.      The patient is also following with  from Nephrology, and states that his condition is stable.    The patient denies any hearing impairment or vision changes, and completes regular eye exams.      There have been no changes to the patient's medications.      Review of Systems   HENT:  Negative for hearing loss.    Gastrointestinal:  Positive for abdominal distention and constipation. Negative for abdominal pain and diarrhea.        Positive for indigestion.       Objective   Physical Exam  Constitutional:       Appearance: Normal appearance.   Neck:      Vascular: No carotid bruit.   Cardiovascular:      Rate and Rhythm: Normal rate and regular rhythm.      Heart sounds: Normal heart sounds.   Pulmonary:      Effort: Pulmonary effort is normal.      Breath sounds: Normal breath sounds.   Abdominal:      General: Bowel sounds are normal.      Palpations: Abdomen is soft.      Tenderness: There is no abdominal tenderness.   Skin:     General: Skin is warm and dry.   Neurological:      General: No focal deficit present.      Mental Status: He is alert and oriented to person, place, and time. Mental status is at baseline.   Psychiatric:         Mood and Affect: Mood normal.         Behavior: Behavior normal.         Assessment/Plan   Problem List Items Addressed This Visit              ICD-10-CM    CKD stage 3 secondary to diabetes (CMS/HCC) E11.22, N18.30    Relevant Orders    Uric acid    Diabetes mellitus type 2, uncomplicated (CMS/HCC) E11.9    Relevant Medications    hydroCHLOROthiazide (HYDRODiuril) 25 mg tablet    empagliflozin (Jardiance) 10 mg    Other Relevant Orders    CBC and Auto Differential    Lipid panel    Comprehensive metabolic panel     Other Visit Diagnoses         Codes    Colon cancer screening    -  Primary Z12.11    Relevant Orders    Cologuard® colon cancer screening    Prostate cancer screening     Z12.5    Relevant Orders    Prostate Spec.Ag,Screen            Medicare Wellness Examination Done  -  Discussed healthy diet and regular exercise.    -  Physical exam overall unremarkable. Immunizations reviewed and updated accordingly. Healthy lifestyle choices discussed (tobacco avoidance, appropriate alcohol use, avoidance of illicit substances).   -  Patient is wearing seatbelt.   -  Screening lab work ordered as indicated.    -  Age appropriate screening tests reviewed with patient.       IMPRESSION/PLAN:      HTN   - /70 in office today. Currently maintained on Losartan 100mg QD, atenolol 25mg BID, HCTZ 25mg QD, and amlodipine 10mg QD.      HLD   - Stable, continue on atorvastatin 40mg QD.     DM II   - Last a1c 7.1% - 2/2024.  Maintained on semaglutide (Ozempic) 2mg/1.5ml as 0.5mg once weekly, Jardiance 10mg QD, NovoLog FlexPen 100 unit/mL 6 units with meals + sliding scale, and Lantus insulin 100 unit/mL 14 units QHS. Previously on metformin 500mg BID.  Following with  in 8/2023.      Aortic Valve Calcification   - Last echocardiogram 7/2018, continue to monitor, takes ASA 81mg QD. Repeat Echo 1/2023 showed normal LVSF with EF 60-65%, moderate aortic valve stenosis, and moderate aortic valve cusp calcification.  Discussed pathophysiology and implications with patient and advised he call the clinic if he experiences lightheadedness,  dizziness, or dyspnea.  Offered referral to Cardiology, but patient would like to hold off for now.     CKD III   - Following with Dr. Kiser in Nephrology, BUN elevated at 26 per 7/2023 RFP and creatinine elevated at 1.91 with GFR decreased at 37. Continue with allopurinol 100mg every day.     BPH   - Last PSA in normal range - 3/2023.  Continue with tamsulosin 0.4mg every day.     Vitamin D Deficiency   - Takes Vitamin D3 125mcg QD.      Health Maintenance   - Routine labs ordered including CBC, CMP, and a lipid panel to be completed in the fasting state. Added Uric Acid, and PSA. Blood type O positive. Last PSA wnl 3/2023. Cologuard negative 7/2021, ordered repeat for 2024. Recommended new Ophthalmologist per patient request.     Follow up in 6 months, call sooner if needed.       Scribe Attestation  By signing my name below, IDanii, Shane   attest that this documentation has been prepared under the direction and in the presence of Med Zhou DO.   Danii Michaud 04/03/24 8:24 AM

## 2024-05-29 ENCOUNTER — OFFICE VISIT (OUTPATIENT)
Dept: ENDOCRINOLOGY | Facility: CLINIC | Age: 74
End: 2024-05-29
Payer: MEDICARE

## 2024-05-29 VITALS
SYSTOLIC BLOOD PRESSURE: 118 MMHG | HEIGHT: 69 IN | WEIGHT: 200 LBS | DIASTOLIC BLOOD PRESSURE: 52 MMHG | BODY MASS INDEX: 29.62 KG/M2

## 2024-05-29 DIAGNOSIS — Z79.4 TYPE 2 DIABETES MELLITUS WITHOUT COMPLICATION, WITH LONG-TERM CURRENT USE OF INSULIN (MULTI): ICD-10-CM

## 2024-05-29 DIAGNOSIS — N18.30 CKD STAGE 3 SECONDARY TO DIABETES (MULTI): ICD-10-CM

## 2024-05-29 DIAGNOSIS — E11.22 CKD STAGE 3 SECONDARY TO DIABETES (MULTI): ICD-10-CM

## 2024-05-29 DIAGNOSIS — E11.9 TYPE 2 DIABETES MELLITUS WITHOUT COMPLICATION, WITH LONG-TERM CURRENT USE OF INSULIN (MULTI): ICD-10-CM

## 2024-05-29 DIAGNOSIS — I10 BENIGN ESSENTIAL HYPERTENSION: Primary | ICD-10-CM

## 2024-05-29 LAB
POC FINGERSTICK BLOOD GLUCOSE: 167 MG/DL (ref 70–100)
POC HEMOGLOBIN A1C: 7.4 % (ref 4.2–6.5)

## 2024-05-29 PROCEDURE — 1160F RVW MEDS BY RX/DR IN RCRD: CPT | Performed by: STUDENT IN AN ORGANIZED HEALTH CARE EDUCATION/TRAINING PROGRAM

## 2024-05-29 PROCEDURE — 3078F DIAST BP <80 MM HG: CPT | Performed by: STUDENT IN AN ORGANIZED HEALTH CARE EDUCATION/TRAINING PROGRAM

## 2024-05-29 PROCEDURE — 1159F MED LIST DOCD IN RCRD: CPT | Performed by: STUDENT IN AN ORGANIZED HEALTH CARE EDUCATION/TRAINING PROGRAM

## 2024-05-29 PROCEDURE — 4010F ACE/ARB THERAPY RXD/TAKEN: CPT | Performed by: STUDENT IN AN ORGANIZED HEALTH CARE EDUCATION/TRAINING PROGRAM

## 2024-05-29 PROCEDURE — 99214 OFFICE O/P EST MOD 30 MIN: CPT | Performed by: STUDENT IN AN ORGANIZED HEALTH CARE EDUCATION/TRAINING PROGRAM

## 2024-05-29 PROCEDURE — 82962 GLUCOSE BLOOD TEST: CPT | Performed by: STUDENT IN AN ORGANIZED HEALTH CARE EDUCATION/TRAINING PROGRAM

## 2024-05-29 PROCEDURE — 83036 HEMOGLOBIN GLYCOSYLATED A1C: CPT | Performed by: STUDENT IN AN ORGANIZED HEALTH CARE EDUCATION/TRAINING PROGRAM

## 2024-05-29 PROCEDURE — 3048F LDL-C <100 MG/DL: CPT | Performed by: STUDENT IN AN ORGANIZED HEALTH CARE EDUCATION/TRAINING PROGRAM

## 2024-05-29 PROCEDURE — 3074F SYST BP LT 130 MM HG: CPT | Performed by: STUDENT IN AN ORGANIZED HEALTH CARE EDUCATION/TRAINING PROGRAM

## 2024-05-29 ASSESSMENT — ENCOUNTER SYMPTOMS: CONSTITUTIONAL NEGATIVE: 1

## 2024-05-29 NOTE — PROGRESS NOTES
"Subjective   Patient ID: Shiva Hanson is a 73 y.o. male who presents for Diabetes (Patient ID: Shiva Hanson is a 73 y.o. male who presents for Diabetes 2 (Dx dm: >15 years /PCP: [facility] /Podiatry: does not see one /Eye exam: yearly; /Patient testing glucose 3 times daily; Family history=mom)   Lab Results   Component Value Date    HGBA1C 7.4 (A) 05/29/2024      HPI  A 73 yr old male, with DM, CKD, presented for follow up   He is doing well , no new concerns apart from Indigestion and Acid reflux with ozempic . He states taht it improved with reducing Coffee and tomato sauce but still needs work on reducing citrus , oranges , lemon and Balsamic vinegar.      History :  he previously followed with Dr. smallwood   currently on :  Lantus 14 units   NovoLog sliding scale ( uses only 1-2 times a week )   Jardiance 10 mg daily, and Metformin 500 mg BID    Patient has lost about 60 Ib in the past year    Review of Systems   Constitutional: Negative.        Objective   Physical Exam  Constitutional:       Appearance: Normal appearance.   Cardiovascular:      Rate and Rhythm: Normal rate and regular rhythm.   Pulmonary:      Effort: Pulmonary effort is normal.      Breath sounds: Normal breath sounds.   Neurological:      General: No focal deficit present.      Mental Status: He is alert.   Psychiatric:         Mood and Affect: Mood normal.      Visit Vitals  /52   Ht 1.753 m (5' 9\")   Wt 90.7 kg (200 lb)   BMI 29.53 kg/m²   Smoking Status Never   BSA 2.1 m²        Assessment/Plan         -relatively controlled DM2 with hba1c of 7.4 on MDI and oral agents   -CKD 3 , on ARB and SGLT2  following with nephrologist Dr. Kiser ( not followed for > 1 yr )  -mild non proliferative Drm retinopathy , follows with opthalmology  -No DM neuropathy , does not follow with podiatry   - HTN controlled on ARB , Norvasc         Plan:   -Continue Ozemipic 0.5 mg weekly ( unable to increase due to indigestion )  -Increase Lantus back to " 14 units daily  -Continue NovoLog sliding scale  -Continue Jardiance 10 mg daily     Urine protein today   -RTC in 3 months

## 2024-08-05 LAB — NONINV COLON CA DNA+OCC BLD SCRN STL QL: NEGATIVE

## 2024-09-12 ENCOUNTER — LAB (OUTPATIENT)
Dept: LAB | Facility: LAB | Age: 74
End: 2024-09-12
Payer: MEDICARE

## 2024-09-12 DIAGNOSIS — E11.9 TYPE 2 DIABETES MELLITUS WITHOUT COMPLICATION, WITH LONG-TERM CURRENT USE OF INSULIN (MULTI): ICD-10-CM

## 2024-09-12 DIAGNOSIS — Z79.4 TYPE 2 DIABETES MELLITUS WITHOUT COMPLICATION, WITH LONG-TERM CURRENT USE OF INSULIN (MULTI): ICD-10-CM

## 2024-09-12 LAB
CREAT UR-MCNC: 38.2 MG/DL (ref 20–370)
MICROALBUMIN UR-MCNC: 376 MG/L
MICROALBUMIN/CREAT UR: 984.3 UG/MG CREAT

## 2024-09-12 PROCEDURE — 82043 UR ALBUMIN QUANTITATIVE: CPT

## 2024-09-12 PROCEDURE — 82570 ASSAY OF URINE CREATININE: CPT

## 2024-09-24 ENCOUNTER — APPOINTMENT (OUTPATIENT)
Dept: ENDOCRINOLOGY | Facility: CLINIC | Age: 74
End: 2024-09-24
Payer: MEDICARE

## 2024-09-24 VITALS
DIASTOLIC BLOOD PRESSURE: 58 MMHG | SYSTOLIC BLOOD PRESSURE: 136 MMHG | HEIGHT: 69 IN | BODY MASS INDEX: 28.88 KG/M2 | WEIGHT: 195 LBS

## 2024-09-24 DIAGNOSIS — Z79.4 TYPE 2 DIABETES MELLITUS WITH HYPERGLYCEMIA, WITH LONG-TERM CURRENT USE OF INSULIN: Primary | ICD-10-CM

## 2024-09-24 DIAGNOSIS — I10 BENIGN ESSENTIAL HYPERTENSION: ICD-10-CM

## 2024-09-24 DIAGNOSIS — E11.65 TYPE 2 DIABETES MELLITUS WITH HYPERGLYCEMIA, WITH LONG-TERM CURRENT USE OF INSULIN: Primary | ICD-10-CM

## 2024-09-24 DIAGNOSIS — R80.9 MICROALBUMINURIA: ICD-10-CM

## 2024-09-24 LAB
POC FINGERSTICK BLOOD GLUCOSE: 119 MG/DL (ref 70–100)
POC HEMOGLOBIN A1C: 6.8 % (ref 4.2–6.5)

## 2024-09-24 PROCEDURE — 99214 OFFICE O/P EST MOD 30 MIN: CPT | Performed by: STUDENT IN AN ORGANIZED HEALTH CARE EDUCATION/TRAINING PROGRAM

## 2024-09-24 PROCEDURE — 3062F POS MACROALBUMINURIA REV: CPT | Performed by: STUDENT IN AN ORGANIZED HEALTH CARE EDUCATION/TRAINING PROGRAM

## 2024-09-24 PROCEDURE — 1159F MED LIST DOCD IN RCRD: CPT | Performed by: STUDENT IN AN ORGANIZED HEALTH CARE EDUCATION/TRAINING PROGRAM

## 2024-09-24 PROCEDURE — 4010F ACE/ARB THERAPY RXD/TAKEN: CPT | Performed by: STUDENT IN AN ORGANIZED HEALTH CARE EDUCATION/TRAINING PROGRAM

## 2024-09-24 PROCEDURE — 3048F LDL-C <100 MG/DL: CPT | Performed by: STUDENT IN AN ORGANIZED HEALTH CARE EDUCATION/TRAINING PROGRAM

## 2024-09-24 PROCEDURE — 3078F DIAST BP <80 MM HG: CPT | Performed by: STUDENT IN AN ORGANIZED HEALTH CARE EDUCATION/TRAINING PROGRAM

## 2024-09-24 PROCEDURE — 82962 GLUCOSE BLOOD TEST: CPT | Performed by: STUDENT IN AN ORGANIZED HEALTH CARE EDUCATION/TRAINING PROGRAM

## 2024-09-24 PROCEDURE — 3008F BODY MASS INDEX DOCD: CPT | Performed by: STUDENT IN AN ORGANIZED HEALTH CARE EDUCATION/TRAINING PROGRAM

## 2024-09-24 PROCEDURE — 3075F SYST BP GE 130 - 139MM HG: CPT | Performed by: STUDENT IN AN ORGANIZED HEALTH CARE EDUCATION/TRAINING PROGRAM

## 2024-09-24 PROCEDURE — 83036 HEMOGLOBIN GLYCOSYLATED A1C: CPT | Performed by: STUDENT IN AN ORGANIZED HEALTH CARE EDUCATION/TRAINING PROGRAM

## 2024-09-24 NOTE — PROGRESS NOTES
"Subjective   Patient ID: Shiva Hanson is a 74 y.o. male who presents for Diabetes ((Patient ID: Shiva Hanson is a 74 y.o. male who presents for Diabetes 2 (Dx dm: >15 years /PCP: [facility] /Podiatry: does not see one /Eye exam: yearly; /Patient testing glucose 3 times daily; Family history=mom) )   Lab Results   Component Value Date    HGBA1C 6.8 (A) 09/24/2024      HPI   A 74 yr old male, with DM, CKD, presented for follow up.  He is doing well , he wants to discuss dietary habits. He doesn't want to see dietitian at this point      History :  he previously followed with Dr. smallwood   currently on :  Ozempic 0.5 mg weekly   Lantus 14 units   NovoLog sliding scale ( uses only 1-2 times a week )   Jardiance 10 mg daily, and Metformin 500 mg BID    Patient has lost about 60 Ib in the past year     Review of Systems    Objective   Physical Exam  Constitutional:       Appearance: Normal appearance.   Cardiovascular:      Rate and Rhythm: Normal rate and regular rhythm.   Pulmonary:      Effort: Pulmonary effort is normal.      Breath sounds: Normal breath sounds.   Neurological:      General: No focal deficit present.      Mental Status: He is alert.   Psychiatric:         Mood and Affect: Mood normal.      Visit Vitals  /58   Ht 1.753 m (5' 9\")   Wt 88.5 kg (195 lb)   BMI 28.80 kg/m²   Smoking Status Never   BSA 2.08 m²        Assessment/Plan        -relatively controlled DM2 with hba1c of 6.8 was 7.4 on MDI and oral agents   -CKD 3 , on ARB and SGLT2  following with nephrologist Dr. Kiser , urine protein/creat >900 micrograms/mg ( not followed for > 1 yr advised to follow up)  -mild non proliferative DM retinopathy , follows with opthalmology  -No DM neuropathy , does not follow with podiatry   - HTN controlled on ARB , Norvasc         Plan:   -Continue Ozemipic 0.5 mg weekly ( unable to increase due to indigestion )  -continue [id number] units daily  -Continue NovoLog sliding scale( very minimal usage " )  -Continue Jardiance 10 mg daily    -RTC in 3-4  months

## 2024-10-04 ENCOUNTER — APPOINTMENT (OUTPATIENT)
Dept: PRIMARY CARE | Facility: CLINIC | Age: 74
End: 2024-10-04
Payer: MEDICARE

## 2024-10-04 VITALS
OXYGEN SATURATION: 98 % | WEIGHT: 199 LBS | RESPIRATION RATE: 16 BRPM | SYSTOLIC BLOOD PRESSURE: 128 MMHG | HEART RATE: 54 BPM | BODY MASS INDEX: 29.39 KG/M2 | TEMPERATURE: 97.6 F | DIASTOLIC BLOOD PRESSURE: 70 MMHG

## 2024-10-04 DIAGNOSIS — E11.22 CKD STAGE 3 SECONDARY TO DIABETES (MULTI): ICD-10-CM

## 2024-10-04 DIAGNOSIS — I35.0 MILD AORTIC STENOSIS: Primary | ICD-10-CM

## 2024-10-04 DIAGNOSIS — N18.30 CKD STAGE 3 SECONDARY TO DIABETES (MULTI): ICD-10-CM

## 2024-10-04 DIAGNOSIS — Z79.4 TYPE 2 DIABETES MELLITUS WITHOUT COMPLICATION, WITH LONG-TERM CURRENT USE OF INSULIN (MULTI): ICD-10-CM

## 2024-10-04 DIAGNOSIS — E11.9 TYPE 2 DIABETES MELLITUS WITHOUT COMPLICATION, WITH LONG-TERM CURRENT USE OF INSULIN (MULTI): ICD-10-CM

## 2024-10-04 DIAGNOSIS — N18.32 CHRONIC KIDNEY DISEASE, STAGE 3B (MULTI): ICD-10-CM

## 2024-10-04 DIAGNOSIS — E11.311 DIABETIC MACULAR EDEMA OF BOTH EYES: ICD-10-CM

## 2024-10-04 DIAGNOSIS — N25.81 HYPERPARATHYROIDISM DUE TO RENAL INSUFFICIENCY (MULTI): ICD-10-CM

## 2024-10-04 PROCEDURE — 3048F LDL-C <100 MG/DL: CPT | Performed by: INTERNAL MEDICINE

## 2024-10-04 PROCEDURE — 1158F ADVNC CARE PLAN TLK DOCD: CPT | Performed by: INTERNAL MEDICINE

## 2024-10-04 PROCEDURE — 1159F MED LIST DOCD IN RCRD: CPT | Performed by: INTERNAL MEDICINE

## 2024-10-04 PROCEDURE — G2211 COMPLEX E/M VISIT ADD ON: HCPCS | Performed by: INTERNAL MEDICINE

## 2024-10-04 PROCEDURE — 3074F SYST BP LT 130 MM HG: CPT | Performed by: INTERNAL MEDICINE

## 2024-10-04 PROCEDURE — 1123F ACP DISCUSS/DSCN MKR DOCD: CPT | Performed by: INTERNAL MEDICINE

## 2024-10-04 PROCEDURE — 1160F RVW MEDS BY RX/DR IN RCRD: CPT | Performed by: INTERNAL MEDICINE

## 2024-10-04 PROCEDURE — 99214 OFFICE O/P EST MOD 30 MIN: CPT | Performed by: INTERNAL MEDICINE

## 2024-10-04 PROCEDURE — 1036F TOBACCO NON-USER: CPT | Performed by: INTERNAL MEDICINE

## 2024-10-04 PROCEDURE — 3078F DIAST BP <80 MM HG: CPT | Performed by: INTERNAL MEDICINE

## 2024-10-04 PROCEDURE — 90662 IIV NO PRSV INCREASED AG IM: CPT | Performed by: INTERNAL MEDICINE

## 2024-10-04 PROCEDURE — 3062F POS MACROALBUMINURIA REV: CPT | Performed by: INTERNAL MEDICINE

## 2024-10-04 PROCEDURE — G0008 ADMIN INFLUENZA VIRUS VAC: HCPCS | Performed by: INTERNAL MEDICINE

## 2024-10-04 PROCEDURE — 4010F ACE/ARB THERAPY RXD/TAKEN: CPT | Performed by: INTERNAL MEDICINE

## 2024-10-04 ASSESSMENT — ENCOUNTER SYMPTOMS
FREQUENCY: 0
LIGHT-HEADEDNESS: 0
SHORTNESS OF BREATH: 0
DIZZINESS: 0

## 2024-10-04 NOTE — PROGRESS NOTES
Patient here for a 6 month follow up    Subjective   Patient ID: Shiva Hanson is a 74 y.o. male who presents for Follow-up.    The patient has been following with  from Endocrinology, and is concerned by recent Urine for Albumin showed an elevated albumin creatinine ratio.  He is trying to schedule a follow-up appointment with  from Nephrology.  The patient is currently taking losartan 100mg once daily and Jardiance 10mg once daily, and is tolerating these medications well.  The last HbA1c was improved at 6.8%,  The patient reports nocturia of two times per evening, and denies any other urinary symptoms.    The patient met with Ophthalmology but was unable to continue with follow-up due to issues with insurance coverage.     The patient mentions joint stiffness in the morning which subsides with activity over the day.    The patient denies any dizziness, lightheadedness, dyspnea, chest pressure, and chest pain.      Review of Systems   Respiratory:  Negative for shortness of breath.    Cardiovascular:  Negative for chest pain.   Genitourinary:  Negative for frequency.        Positive for nocturia of two times per evening.   Neurological:  Negative for dizziness and light-headedness.       Objective   Physical Exam  Constitutional:       Appearance: Normal appearance.   Neck:      Vascular: No carotid bruit.   Cardiovascular:      Rate and Rhythm: Normal rate and regular rhythm.      Heart sounds: Normal heart sounds.   Pulmonary:      Effort: Pulmonary effort is normal.      Breath sounds: Normal breath sounds.   Abdominal:      General: Bowel sounds are normal.      Palpations: Abdomen is soft.      Tenderness: There is no abdominal tenderness.   Skin:     General: Skin is warm and dry.   Neurological:      General: No focal deficit present.      Mental Status: He is alert and oriented to person, place, and time. Mental status is at baseline.   Psychiatric:         Mood and Affect: Mood  normal.         Behavior: Behavior normal.       Assessment/Plan   Problem List Items Addressed This Visit             ICD-10-CM    Mild aortic stenosis - Primary I35.0    Relevant Orders    Transthoracic Echo (TTE) Complete       IMPRESSION/PLAN:      Aortic Valve Calcification   - Last echocardiogram 7/2018, continue to monitor, takes ASA 81mg QD. Repeat Echo 1/2023 showed normal LVSF with EF 60-65%, moderate aortic valve stenosis, and moderate aortic valve cusp calcification.  Discussed pathophysiology and implications with patient and advised he call the clinic if he experiences lightheadedness, dizziness, or dyspnea.  Offered referral to Cardiology, but patient would like to hold off for now.  Ordered repeat Echocardiogram.    CKD III   - Following with Dr. Kiser in Nephrology, BUN elevated at 26 per 7/2023 RFP and creatinine elevated at 1.97 with GFR decreased at 35. Continue with allopurinol 100mg every day.  Patient will try to set up appointment with , and if unable to do so, will refer to  in Nephrology.    HTN   - /70 in office today. Currently maintained on Losartan 100mg QD, atenolol 25mg BID, HCTZ 25mg QD, and amlodipine 10mg QD.      HLD   - Stable, continue on atorvastatin 40mg QD.     DM II   - Last a1c 6.8% - 9/2024.  Maintained on semaglutide (Ozempic) 2mg/1.5ml as 0.5mg once weekly, Jardiance 10mg QD, NovoLog FlexPen 100 unit/mL 6 units with meals + sliding scale, and Lantus insulin 100 unit/mL 14 units QHS. Previously on metformin 500mg BID.  Following with .      BPH   - Last PSA in normal range - 3/2023.  Continue with tamsulosin 0.4mg every day.     Vitamin D Deficiency   - Takes Vitamin D3 125mcg QD.      Health Maintenance   - Routine labs 4/2024. Blood type O positive. Last PSA wnl 4/2024. Cologuard negative 7/2024, repeat due 7/2027.  Patient received High Dose Influenza vaccine in the clinic today, tolerated well. Recommended new Ophthalmologist per  patient request.      Follow up in 6 months, call sooner if needed.       Scribe Attestation  By signing my name below, I, Danii Michaud, Shane   attest that this documentation has been prepared under the direction and in the presence of Med Zhou DO.   Danii Michaud 10/04/24 8:29 AM

## 2024-10-31 ENCOUNTER — HOSPITAL ENCOUNTER (OUTPATIENT)
Dept: CARDIOLOGY | Facility: CLINIC | Age: 74
Discharge: HOME | End: 2024-10-31
Payer: MEDICARE

## 2024-10-31 DIAGNOSIS — R01.1 CARDIAC MURMUR, UNSPECIFIED: ICD-10-CM

## 2024-10-31 DIAGNOSIS — I35.0 MILD AORTIC STENOSIS: ICD-10-CM

## 2024-10-31 LAB
AORTIC VALVE MEAN GRADIENT: 43.3 MMHG
AORTIC VALVE PEAK VELOCITY: 4 M/S
AV PEAK GRADIENT: 63.9 MMHG
AVA (PEAK VEL): 0.89 CM2
AVA (VTI): 0.89 CM2
EJECTION FRACTION APICAL 4 CHAMBER: 63.7
EJECTION FRACTION: 68 %
LEFT ATRIUM VOLUME AREA LENGTH INDEX BSA: 34 ML/M2
LEFT VENTRICULAR OUTFLOW TRACT DIAMETER: 2.1 CM
MITRAL VALVE E/A RATIO: 0.38
RIGHT VENTRICLE FREE WALL PEAK S': 11 CM/S
RIGHT VENTRICLE PEAK SYSTOLIC PRESSURE: 34.2 MMHG
TRICUSPID ANNULAR PLANE SYSTOLIC EXCURSION: 2.6 CM

## 2024-10-31 PROCEDURE — 93306 TTE W/DOPPLER COMPLETE: CPT | Performed by: STUDENT IN AN ORGANIZED HEALTH CARE EDUCATION/TRAINING PROGRAM

## 2024-10-31 PROCEDURE — 93306 TTE W/DOPPLER COMPLETE: CPT

## 2024-11-20 ENCOUNTER — TELEMEDICINE (OUTPATIENT)
Dept: PRIMARY CARE | Facility: CLINIC | Age: 74
End: 2024-11-20
Payer: MEDICARE

## 2024-11-20 VITALS — DIASTOLIC BLOOD PRESSURE: 83 MMHG | SYSTOLIC BLOOD PRESSURE: 127 MMHG

## 2024-11-20 DIAGNOSIS — I35.0 AORTIC VALVE STENOSIS, ETIOLOGY OF CARDIAC VALVE DISEASE UNSPECIFIED: Primary | ICD-10-CM

## 2024-11-20 PROCEDURE — 3062F POS MACROALBUMINURIA REV: CPT | Performed by: INTERNAL MEDICINE

## 2024-11-20 PROCEDURE — 4010F ACE/ARB THERAPY RXD/TAKEN: CPT | Performed by: INTERNAL MEDICINE

## 2024-11-20 PROCEDURE — 1159F MED LIST DOCD IN RCRD: CPT | Performed by: INTERNAL MEDICINE

## 2024-11-20 PROCEDURE — 1160F RVW MEDS BY RX/DR IN RCRD: CPT | Performed by: INTERNAL MEDICINE

## 2024-11-20 PROCEDURE — 3048F LDL-C <100 MG/DL: CPT | Performed by: INTERNAL MEDICINE

## 2024-11-20 PROCEDURE — 99213 OFFICE O/P EST LOW 20 MIN: CPT | Performed by: INTERNAL MEDICINE

## 2024-11-20 PROCEDURE — 3079F DIAST BP 80-89 MM HG: CPT | Performed by: INTERNAL MEDICINE

## 2024-11-20 PROCEDURE — 1036F TOBACCO NON-USER: CPT | Performed by: INTERNAL MEDICINE

## 2024-11-20 PROCEDURE — 3074F SYST BP LT 130 MM HG: CPT | Performed by: INTERNAL MEDICINE

## 2024-11-20 ASSESSMENT — ENCOUNTER SYMPTOMS
LIGHT-HEADEDNESS: 0
SHORTNESS OF BREATH: 0
DIZZINESS: 0

## 2024-11-20 NOTE — PROGRESS NOTES
Patient doing a virtual visit for a follow up for test results   Virtual or Telephone Consent    An interactive audio and video telecommunication system which permits real time communications between the patient (at the originating site) and provider (at the distant site) was utilized to provide this telehealth service.   Verbal consent was requested and obtained from Shiva Hanson on this date, 11/20/24 for a telehealth visit.      Subjective   Patient ID: Shiva Hanson is a 74 y.o. male who presents for Follow-up.    The patient maintains a relatively active lifestyle, and was able to rake leaves in his yard yesterday.  He denies any dizziness, lightheadedness, dyspnea, chest pressure, or chest pain.        Review of Systems   Respiratory:  Negative for shortness of breath.    Cardiovascular:  Negative for chest pain.   Neurological:  Negative for dizziness and light-headedness.     Objective   Physical Exam  Comfortable appearing in NAD    Assessment/Plan   Problem List Items Addressed This Visit    None  Visit Diagnoses         Codes    Aortic valve stenosis, etiology of cardiac valve disease unspecified    -  Primary I35.0    Relevant Orders    Referral to Cardiology            IMPRESSION/PLAN:      Aortic Valve Calcification   - Repeat Echo 10/2024 showed normal LVSF with EF 65-70%, LA mildly dilated, and severe AV stenosis.  Discussed pathophysiology and implications in detail with patient and advised he call the clinic if he experiences lightheadedness, dizziness, dyspnea, or chest pain.  Recommended Mediterranean diet along with regular physical activity.  Ordered referral to Cardiology with , and patient given contact information.      HTN   - /83 in the home today. Currently maintained on Losartan 100mg QD, atenolol 25mg BID, HCTZ 25mg QD, and amlodipine 10mg QD.      HLD   - Stable, continue on atorvastatin 40mg QD.     DM II   - Last a1c 6.8% - 9/2024.  Maintained on semaglutide  (Ozempic) 2mg/1.5ml as 0.5mg once weekly, Jardiance 10mg QD, NovoLog FlexPen 100 unit/mL 6 units with meals + sliding scale, and Lantus insulin 100 unit/mL 14 units QHS. Previously on metformin 500mg BID.  Following with .      CKD III   - Following with Dr. Kiser in Nephrology, BUN elevated at 26 per 7/2023 RFP and creatinine elevated at 1.97 with GFR decreased at 35. Continue with allopurinol 100mg every day.  Patient will try to set up appointment with , and if unable to do so, will refer to  in Nephrology.    BPH   - Last PSA in normal range - 3/2023.  Continue with tamsulosin 0.4mg every day.     Vitamin D Deficiency   - Takes Vitamin D3 125mcg QD.      Health Maintenance   - Routine labs 4/2024. Blood type O positive. Last PSA wnl 4/2024. Cologuard negative 7/2024, repeat due 7/2027.       Follow up in 6 months, call sooner if needed.       Scribe Attestation  By signing my name below, I, Danii Michaud, Shane   attest that this documentation has been prepared under the direction and in the presence of Med Zhou DO.   Danii Michaud 11/20/24 11:57 AM

## 2024-11-22 ENCOUNTER — APPOINTMENT (OUTPATIENT)
Dept: PRIMARY CARE | Facility: CLINIC | Age: 74
End: 2024-11-22
Payer: MEDICARE

## 2024-11-25 ENCOUNTER — APPOINTMENT (OUTPATIENT)
Dept: PRIMARY CARE | Facility: CLINIC | Age: 74
End: 2024-11-25
Payer: MEDICARE

## 2024-12-12 ENCOUNTER — APPOINTMENT (OUTPATIENT)
Dept: CARDIOLOGY | Facility: CLINIC | Age: 74
End: 2024-12-12
Payer: MEDICARE

## 2024-12-12 VITALS
DIASTOLIC BLOOD PRESSURE: 68 MMHG | SYSTOLIC BLOOD PRESSURE: 122 MMHG | HEART RATE: 55 BPM | WEIGHT: 196 LBS | HEIGHT: 69 IN | BODY MASS INDEX: 29.03 KG/M2

## 2024-12-12 DIAGNOSIS — R00.2 PALPITATIONS: ICD-10-CM

## 2024-12-12 DIAGNOSIS — I35.0 NONRHEUMATIC AORTIC VALVE STENOSIS: Primary | ICD-10-CM

## 2024-12-12 DIAGNOSIS — I35.0 AORTIC VALVE STENOSIS, ETIOLOGY OF CARDIAC VALVE DISEASE UNSPECIFIED: ICD-10-CM

## 2024-12-12 DIAGNOSIS — E78.5 DYSLIPIDEMIA: ICD-10-CM

## 2024-12-12 DIAGNOSIS — I10 PRIMARY HYPERTENSION: ICD-10-CM

## 2024-12-12 PROCEDURE — 99204 OFFICE O/P NEW MOD 45 MIN: CPT | Performed by: INTERNAL MEDICINE

## 2024-12-12 PROCEDURE — 4010F ACE/ARB THERAPY RXD/TAKEN: CPT | Performed by: INTERNAL MEDICINE

## 2024-12-12 PROCEDURE — 1159F MED LIST DOCD IN RCRD: CPT | Performed by: INTERNAL MEDICINE

## 2024-12-12 PROCEDURE — 3008F BODY MASS INDEX DOCD: CPT | Performed by: INTERNAL MEDICINE

## 2024-12-12 PROCEDURE — 3078F DIAST BP <80 MM HG: CPT | Performed by: INTERNAL MEDICINE

## 2024-12-12 PROCEDURE — 3062F POS MACROALBUMINURIA REV: CPT | Performed by: INTERNAL MEDICINE

## 2024-12-12 PROCEDURE — 93000 ELECTROCARDIOGRAM COMPLETE: CPT | Performed by: INTERNAL MEDICINE

## 2024-12-12 PROCEDURE — G2211 COMPLEX E/M VISIT ADD ON: HCPCS | Performed by: INTERNAL MEDICINE

## 2024-12-12 PROCEDURE — 3048F LDL-C <100 MG/DL: CPT | Performed by: INTERNAL MEDICINE

## 2024-12-12 PROCEDURE — 1036F TOBACCO NON-USER: CPT | Performed by: INTERNAL MEDICINE

## 2024-12-12 PROCEDURE — 3074F SYST BP LT 130 MM HG: CPT | Performed by: INTERNAL MEDICINE

## 2024-12-12 NOTE — PATIENT INSTRUCTIONS
"It was my pleasure to meet you.  I look forward to being your cardiologist.  I am a huge believer in communicating with my patients.  Please contact me at any time, if anything is not clear to you regarding anything we have discussed, or if new questions occur to you.     You should increase your intake of fresh fruits and vegetables.  Try to consume 9-12 servings per day of such foods.  You should increase your intake of deep sea fish such as salmon and tuna.  Try to get two servings per week of fish, but if you are a pregnant woman, talk to your obstetrician before increasing your fish intake.  You should increase your intake of unprocessed nuts such as walnuts or almonds.  Increase your intake of plant-based protein.  You should avoid fried foods.  Don't consume sugary or starchy foods and sugary drinks.  Avoid saturated fats.  Try not to dine at restaurants more than once per month, and don't dine at fast food places.  Try to get 7-9 hours of sleep every night.  Try to get 150 minutes per week of moderate intensity exercise (after I have cleared you to start an exercise program).  Try to maintain the appropriate weight for your height based on body mass index (BMI). Maintain your cholesterol, blood sugar, and blood pressure in the recommended respective normal ranges.  There is a wealth of information on the American Heart Association's website regarding this.  Just Google \"Life's Essential 8\" for more information.   Ask me about any of these details  if you have questions.    As your cardiologist, I will be available to you at any time to answer any question you have concerning your heart health.  My staff, Shana can also answer any questions you may have.  Best of luck.       It is important for us to have an accurate list of the medications, supplements, and their doses.  It is also important for us to have an accurate list of your allergies.  Please bring this information to every appointment.  " This is a vital part of the quality of care you receive through all of your providers.

## 2024-12-12 NOTE — ASSESSMENT & PLAN NOTE
Functionally he is class I with really no change in functional capacity from a cardiac perspective in the recent past.  Severe aortic stenosis: functionally class I.  We discussed the natural history of aortic stenosis based on onset of symptoms relative to severity of disease, and subsequent prognosis.  We discussed the do's and don'ts of physical activity and exertion.   I advised the patient to contact me with any change in symptoms, as we discussed.    Orders:    Follow Up In Cardiology; Future

## 2024-12-12 NOTE — PROGRESS NOTES
"Chief Complaint:   Please see below.     History Of Present Illness:    Shiva Hanson is a 74 y.o. male presenting with aortic stenosis.    This 74 year old hypertensive, diabetic, hyperlipidemic retired [employer]  is a patient of Dr. Zhou, who advised him to see me because of severe aortic stenosis.    The patient's primary care physician has been following him with serial echocardiography for aortic stenosis.  The patient's most recent echocardiogram in October 2024 disclosed an ejection fraction of 65 to 70% with a V-max across the aortic valve of 4.0 m/s corresponding with peak and mean gradients of 64 and 43 mm respectively.  The aortic valve area was 0.9 cm² with a dimensionless index of 0.26.  Comment was made also on thickening of the mitral valve with moderate mitral annular calcification, 1-2+ mitral regurgitation, 2+ tricuspid regurgitation, and an estimated RV systolic pressure of 34 mm.    The patient denies chest discomfort, dyspnea,  orthopnea, PND, syncope, and near syncope.  The patient reports he experiences episodic palpitations.  He has such symptoms about once a month, and these symptoms occur with no predictability..  He correlates the symptoms with excessive caffeine intake (he was drinking a pot of coffee  per day, but has cut back to 1-2 cups per day, with improvement in symptoms).      He does not exercise on a regular basis.  The patient can do household chores such as  cleaning, and shopping, mowing the lawn, yard work etc. He estimates he can a half mile without cardiac symptoms.        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        Vitals:    12/12/24 1500   BP: 122/68   BP Location: Left arm   Patient Position: Sitting   Pulse: 55   Weight: 88.9 kg (196 lb)   Height: 1.753 m (5' 9\")       Past Medical History:  He has a past medical history of Age-related nuclear cataract, left eye (10/15/2020), Age-related nuclear cataract, left eye (10/15/2020), " Age-related nuclear cataract, right eye (10/15/2020), Age-related nuclear cataract, right eye (10/15/2020), Personal history of other diseases of the nervous system and sense organs (04/11/2016), 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    Past Surgical History:  He has a past surgical history that includes Knee arthroscopy w/ debridement (05/28/2014).      Social History:  He reports that he has never smoked. He has never used smokeless tobacco. He reports that he does not drink alcohol and does not use drugs.    Family History:  Family History   Problem Relation Name Age of Onset    Diabetes Mother      Hypertension Father      Other (Hardening of Arteries.) Father      Diabetes Sibling          Allergies:  Patient has no known allergies.    Outpatient Medications:  Current Outpatient Medications   Medication Instructions    allopurinol (ZYLOPRIM) 100 mg, Daily    amLODIPine (NORVASC) 10 mg, oral, Daily    aspirin 81 mg EC tablet 1 tablet, Daily    atenolol (TENORMIN) 25 mg, oral, 2 times daily    atorvastatin (LIPITOR) 40 mg, oral, Nightly    blood sugar diagnostic (OneTouch Verio test strips) strip 1 strip, miscellaneous, 3 times daily    cholecalciferol (Vitamin D-3) 5,000 Units tablet 1 tablet, Daily    empagliflozin (JARDIANCE) 10 mg, oral, Daily    hydroCHLOROthiazide (HYDRODIURIL) 25 mg, oral, Daily    insulin aspart (NovoLOG) 100 unit/mL (3 mL) pen 6 units with meals plus up to 10 units per sliding scale     "insulin glargine (Lantus) 100 unit/mL (3 mL) pen INJECT  14 UNITS SUBCUTANEOUSLY BEFORE BEDTIME    losartan (COZAAR) 100 mg, oral, Daily    pen needle, diabetic 32 gauge x 5/32\" needle 1 per day for E11.9 insulin injections    semaglutide (OZEMPIC) 0.5 mg, subcutaneous, Once Weekly    tamsulosin (Flomax) 0.4 mg 24 hr capsule 1 tab q hs daily       Physical Exam:  GENERAL:  pleasant 74 year-old  HEENT: No xanthelasma  NECK: Supple, no palpable adenopathy or thyromegaly  CHEST: Clear to auscultation, respiratory effort unlabored  CARDIAC: RRR, normal S1 and S2, A2 is distinct; no audible  rub, gallop, carotids are brisk, PMI is not displaced; there is a 3/6 crescendo decrescendo murmur heard best at the RSB, audible over the entire precordium, and radiating to the carotids.  ABD: Active bowel sounds, nontender, no organomegaly, no evidence of ascites  EXT: No clubbing, cyanosis, edema, or tenderness  NEURO: Awake, alert, appropriate, speech is fluent       Last Labs:  CBC -  Lab Results   Component Value Date    WBC 10.5 04/03/2024    HGB 16.5 04/03/2024    HCT 50.2 04/03/2024    MCV 93 04/03/2024     04/03/2024       CMP -  Lab Results   Component Value Date    CALCIUM 9.5 04/03/2024    PHOS 3.3 07/29/2023    PROT 6.9 04/03/2024    ALBUMIN 3.9 04/03/2024    AST 14 04/03/2024    ALT 13 04/03/2024    ALKPHOS 89 04/03/2024    BILITOT 0.7 04/03/2024       LIPID PANEL -   Lab Results   Component Value Date    CHOL 111 04/03/2024    TRIG 50 04/03/2024    HDL 43.9 04/03/2024    CHHDL 2.5 04/03/2024    LDLF 62 03/23/2023    VLDL 10 04/03/2024    NHDL 67 04/03/2024       RENAL FUNCTION PANEL -   Lab Results   Component Value Date    GLUCOSE 104 (H) 04/03/2024     04/03/2024    K 4.1 04/03/2024     04/03/2024    CO2 26 04/03/2024    ANIONGAP 14 04/03/2024    BUN 39 (H) 04/03/2024    CREATININE 1.97 (H) 04/03/2024    GFRMALE 37 (A) 07/29/2023    CALCIUM 9.5 04/03/2024    PHOS 3.3 07/29/2023    ALBUMIN 3.9 " 04/03/2024        Lab Results   Component Value Date    HGBA1C 6.8 (A) 09/24/2024         Lab review: I have Chemistry CMP:   Lab Results   Component Value Date    ALBUMIN 3.9 04/03/2024    CALCIUM 9.5 04/03/2024    CO2 26 04/03/2024    CREATININE 1.97 (H) 04/03/2024    GLUCOSE 104 (H) 04/03/2024    BILITOT 0.7 04/03/2024    PROT 6.9 04/03/2024    ALT 13 04/03/2024    AST 14 04/03/2024    ALKPHOS 89 04/03/2024   , Chemistry BMP   Lab Results   Component Value Date    GLUCOSE 104 (H) 04/03/2024    CALCIUM 9.5 04/03/2024    CO2 26 04/03/2024    CREATININE 1.97 (H) 04/03/2024   , CBC:  Lab Results   Component Value Date    WBC 10.5 04/03/2024    RBC 5.38 04/03/2024    HGB 16.5 04/03/2024    HCT 50.2 04/03/2024    MCV 93 04/03/2024    MCH 30.7 04/03/2024    MCHC 32.9 04/03/2024    RDW 13.2 04/03/2024    NRBC 0.0 04/03/2024   , and Lipids:   Lab Results   Component Value Date    CHOL 111 04/03/2024    HDL 43.9 04/03/2024    LDLCALC 57 04/03/2024    TRIG 50 04/03/2024     Diagnostic review: I have independently interpreted the Echocardiogram .  My findings are as summarized in the report.    Assessment/Plan   Assessment & Plan  Aortic valve stenosis, etiology of cardiac valve disease unspecified      Orders:    Referral to Cardiology    ECG 12 lead (Clinic Performed)    Nonrheumatic aortic valve stenosis  Functionally he is class I with really no change in functional capacity from a cardiac perspective in the recent past.  Severe aortic stenosis: functionally class I.  We discussed the natural history of aortic stenosis based on onset of symptoms relative to severity of disease, and subsequent prognosis.  We discussed the do's and don'ts of physical activity and exertion.   I advised the patient to contact me with any change in symptoms, as we discussed.    Orders:    Follow Up In Cardiology; Future    Palpitations    Orders:    Holter Or Event Cardiac Monitor; Future    Follow Up In Cardiology; Future    Primary  hypertension  HTN:  BP is well controlled.   We discussed sodium restriction, lifestyle modification, and the DASH diet.  I advised the patient to check BPs at home daily, and to contact me with an update in one month.    Orders:    Follow Up In Cardiology; Future    Dyslipidemia   The patient's most recent lipid profile reflects that lipid endpoints are at target.    Orders:    Follow Up In Cardiology; Future          Javier Olivas MD

## 2024-12-21 ENCOUNTER — HOSPITAL ENCOUNTER (OUTPATIENT)
Dept: CARDIOLOGY | Facility: HOSPITAL | Age: 74
Discharge: HOME | End: 2024-12-21
Payer: MEDICARE

## 2024-12-21 DIAGNOSIS — R00.2 PALPITATIONS: ICD-10-CM

## 2024-12-21 PROCEDURE — 93270 REMOTE 30 DAY ECG REV/REPORT: CPT

## 2025-01-06 ENCOUNTER — APPOINTMENT (OUTPATIENT)
Dept: ENDOCRINOLOGY | Facility: CLINIC | Age: 75
End: 2025-01-06
Payer: MEDICARE

## 2025-01-06 VITALS
SYSTOLIC BLOOD PRESSURE: 160 MMHG | BODY MASS INDEX: 29.2 KG/M2 | DIASTOLIC BLOOD PRESSURE: 64 MMHG | WEIGHT: 204 LBS | HEIGHT: 70 IN

## 2025-01-06 DIAGNOSIS — I10 BENIGN ESSENTIAL HYPERTENSION: ICD-10-CM

## 2025-01-06 DIAGNOSIS — E11.65 TYPE 2 DIABETES MELLITUS WITH HYPERGLYCEMIA, WITH LONG-TERM CURRENT USE OF INSULIN: Primary | ICD-10-CM

## 2025-01-06 DIAGNOSIS — N18.30 CKD STAGE 3 SECONDARY TO DIABETES (MULTI): ICD-10-CM

## 2025-01-06 DIAGNOSIS — Z79.4 TYPE 2 DIABETES MELLITUS WITH HYPERGLYCEMIA, WITH LONG-TERM CURRENT USE OF INSULIN: Primary | ICD-10-CM

## 2025-01-06 DIAGNOSIS — E11.22 CKD STAGE 3 SECONDARY TO DIABETES (MULTI): ICD-10-CM

## 2025-01-06 LAB
POC FINGERSTICK BLOOD GLUCOSE: 78 MG/DL (ref 70–100)
POC HEMOGLOBIN A1C: 8.1 % (ref 4.2–6.5)

## 2025-01-06 PROCEDURE — 4010F ACE/ARB THERAPY RXD/TAKEN: CPT | Performed by: STUDENT IN AN ORGANIZED HEALTH CARE EDUCATION/TRAINING PROGRAM

## 2025-01-06 PROCEDURE — 3078F DIAST BP <80 MM HG: CPT | Performed by: STUDENT IN AN ORGANIZED HEALTH CARE EDUCATION/TRAINING PROGRAM

## 2025-01-06 PROCEDURE — 99214 OFFICE O/P EST MOD 30 MIN: CPT | Performed by: STUDENT IN AN ORGANIZED HEALTH CARE EDUCATION/TRAINING PROGRAM

## 2025-01-06 PROCEDURE — 83036 HEMOGLOBIN GLYCOSYLATED A1C: CPT | Performed by: STUDENT IN AN ORGANIZED HEALTH CARE EDUCATION/TRAINING PROGRAM

## 2025-01-06 PROCEDURE — 82962 GLUCOSE BLOOD TEST: CPT | Performed by: STUDENT IN AN ORGANIZED HEALTH CARE EDUCATION/TRAINING PROGRAM

## 2025-01-06 PROCEDURE — 3077F SYST BP >= 140 MM HG: CPT | Performed by: STUDENT IN AN ORGANIZED HEALTH CARE EDUCATION/TRAINING PROGRAM

## 2025-01-06 PROCEDURE — G2211 COMPLEX E/M VISIT ADD ON: HCPCS | Performed by: STUDENT IN AN ORGANIZED HEALTH CARE EDUCATION/TRAINING PROGRAM

## 2025-01-06 PROCEDURE — 3008F BODY MASS INDEX DOCD: CPT | Performed by: STUDENT IN AN ORGANIZED HEALTH CARE EDUCATION/TRAINING PROGRAM

## 2025-01-06 PROCEDURE — 1159F MED LIST DOCD IN RCRD: CPT | Performed by: STUDENT IN AN ORGANIZED HEALTH CARE EDUCATION/TRAINING PROGRAM

## 2025-01-06 RX ORDER — SEMAGLUTIDE 1.34 MG/ML
1 INJECTION, SOLUTION SUBCUTANEOUS WEEKLY
Qty: 9 ML | Refills: 1 | Status: SHIPPED | OUTPATIENT
Start: 2025-01-06 | End: 2026-01-06

## 2025-01-06 NOTE — PROGRESS NOTES
"Subjective   Patient ID: Shiva Hanson is a 74 y.o. male who presents for Diabetes (Shiva Hanson is a 74 y.o. male who presents for Diabetes 2 (Dx dm: >15 years /PCP: [facility] /Podiatry: does not see one /Eye exam: yearly; /Patient testing glucose 3 times daily; Family history=mom) ) ).   Lab Results   Component Value Date    HGBA1C 8.1 (A) 01/06/2025      HPI   A 74 yr old male, with DM, CKD, presented for follow up.  Gained 9 lbs since last visit   A1c increased from 6.8 to 8.1      History :  he previously followed with Dr. smallwood   currently on :  Ozempic 0.5 mg weekly   Lantus 14 units   NovoLog sliding scale ( uses only 1-2 times a week )   Jardiance 10 mg daily, and Metformin 500 mg BID    Patient has lost about 60 Ib in the past year  Review of Systems    Objective   Physical Exam  Constitutional:       Appearance: Normal appearance.   Cardiovascular:      Rate and Rhythm: Normal rate and regular rhythm.   Pulmonary:      Effort: Pulmonary effort is normal.      Breath sounds: Normal breath sounds.   Neurological:      General: No focal deficit present.      Mental Status: He is alert.   Psychiatric:         Mood and Affect: Mood normal.      Visit Vitals  /64   Ht 1.778 m (5' 10\")   Wt 92.5 kg (204 lb)   BMI 29.27 kg/m²   Smoking Status Never   BSA 2.14 m²        Assessment/Plan          -Uncontrolled DM2 with hba1c of  8 was 6.8 ,7.4 on MDI and oral agents   -CKD 3 , on ARB and SGLT2  following with nephrologist Dr. 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

## 2025-01-25 ENCOUNTER — LAB (OUTPATIENT)
Dept: LAB | Facility: LAB | Age: 75
End: 2025-01-25
Payer: MEDICARE

## 2025-01-25 DIAGNOSIS — E11.21 TYPE 2 DIABETES MELLITUS WITH DIABETIC NEPHROPATHY: Primary | ICD-10-CM

## 2025-01-25 DIAGNOSIS — N18.32 CHRONIC KIDNEY DISEASE, STAGE 3B (MULTI): ICD-10-CM

## 2025-01-25 DIAGNOSIS — E79.9 DISORDER OF PURINE AND PYRIMIDINE METABOLISM, UNSPECIFIED: ICD-10-CM

## 2025-01-25 DIAGNOSIS — I12.9 HYPERTENSIVE CHRONIC KIDNEY DISEASE WITH STAGE 1 THROUGH STAGE 4 CHRONIC KIDNEY DISEASE, OR UNSPECIFIED CHRONIC KIDNEY DISEASE: ICD-10-CM

## 2025-01-25 LAB
ALBUMIN SERPL BCP-MCNC: 3.9 G/DL (ref 3.4–5)
ANION GAP SERPL CALC-SCNC: 12 MMOL/L (ref 10–20)
BUN SERPL-MCNC: 46 MG/DL (ref 6–23)
CALCIUM SERPL-MCNC: 8.7 MG/DL (ref 8.6–10.6)
CHLORIDE SERPL-SCNC: 104 MMOL/L (ref 98–107)
CO2 SERPL-SCNC: 23 MMOL/L (ref 21–32)
CREAT SERPL-MCNC: 2.16 MG/DL (ref 0.5–1.3)
CREAT UR-MCNC: 59.3 MG/DL (ref 20–370)
EGFRCR SERPLBLD CKD-EPI 2021: 31 ML/MIN/1.73M*2
GLUCOSE SERPL-MCNC: 164 MG/DL (ref 74–99)
MICROALBUMIN UR-MCNC: 799.9 MG/L
MICROALBUMIN/CREAT UR: 1348.9 UG/MG CREAT
PHOSPHATE SERPL-MCNC: 3.2 MG/DL (ref 2.5–4.9)
POTASSIUM SERPL-SCNC: 4.1 MMOL/L (ref 3.5–5.3)
SODIUM SERPL-SCNC: 135 MMOL/L (ref 136–145)

## 2025-01-25 PROCEDURE — 82570 ASSAY OF URINE CREATININE: CPT

## 2025-01-25 PROCEDURE — 82043 UR ALBUMIN QUANTITATIVE: CPT

## 2025-01-25 PROCEDURE — 80069 RENAL FUNCTION PANEL: CPT

## 2025-02-11 ENCOUNTER — APPOINTMENT (OUTPATIENT)
Dept: CARDIOLOGY | Facility: CLINIC | Age: 75
End: 2025-02-11
Payer: MEDICARE

## 2025-02-14 ENCOUNTER — APPOINTMENT (OUTPATIENT)
Dept: CARDIOLOGY | Facility: CLINIC | Age: 75
End: 2025-02-14
Payer: MEDICARE

## 2025-02-24 ENCOUNTER — APPOINTMENT (OUTPATIENT)
Dept: CARDIOLOGY | Facility: CLINIC | Age: 75
End: 2025-02-24
Payer: MEDICARE

## 2025-02-24 VITALS
DIASTOLIC BLOOD PRESSURE: 78 MMHG | SYSTOLIC BLOOD PRESSURE: 150 MMHG | WEIGHT: 198 LBS | BODY MASS INDEX: 28.35 KG/M2 | HEIGHT: 70 IN | HEART RATE: 72 BPM

## 2025-02-24 DIAGNOSIS — R00.2 PALPITATIONS: ICD-10-CM

## 2025-02-24 DIAGNOSIS — I47.29 NONSUSTAINED VENTRICULAR TACHYCARDIA (MULTI): ICD-10-CM

## 2025-02-24 DIAGNOSIS — I35.0 NONRHEUMATIC AORTIC VALVE STENOSIS: ICD-10-CM

## 2025-02-24 DIAGNOSIS — R07.89 BURNING CHEST PAIN: ICD-10-CM

## 2025-02-24 DIAGNOSIS — E78.5 DYSLIPIDEMIA: ICD-10-CM

## 2025-02-24 DIAGNOSIS — I47.19 ATRIAL TACHYCARDIA (CMS-HCC): ICD-10-CM

## 2025-02-24 DIAGNOSIS — I10 PRIMARY HYPERTENSION: ICD-10-CM

## 2025-02-24 DIAGNOSIS — I45.5 SINUS PAUSE: Primary | ICD-10-CM

## 2025-02-24 PROCEDURE — 4010F ACE/ARB THERAPY RXD/TAKEN: CPT | Performed by: INTERNAL MEDICINE

## 2025-02-24 PROCEDURE — 99215 OFFICE O/P EST HI 40 MIN: CPT | Performed by: INTERNAL MEDICINE

## 2025-02-24 PROCEDURE — 3062F POS MACROALBUMINURIA REV: CPT | Performed by: INTERNAL MEDICINE

## 2025-02-24 PROCEDURE — 1036F TOBACCO NON-USER: CPT | Performed by: INTERNAL MEDICINE

## 2025-02-24 PROCEDURE — 93000 ELECTROCARDIOGRAM COMPLETE: CPT | Performed by: INTERNAL MEDICINE

## 2025-02-24 PROCEDURE — G2211 COMPLEX E/M VISIT ADD ON: HCPCS | Performed by: INTERNAL MEDICINE

## 2025-02-24 PROCEDURE — 3077F SYST BP >= 140 MM HG: CPT | Performed by: INTERNAL MEDICINE

## 2025-02-24 PROCEDURE — 1159F MED LIST DOCD IN RCRD: CPT | Performed by: INTERNAL MEDICINE

## 2025-02-24 PROCEDURE — 3008F BODY MASS INDEX DOCD: CPT | Performed by: INTERNAL MEDICINE

## 2025-02-24 PROCEDURE — 3078F DIAST BP <80 MM HG: CPT | Performed by: INTERNAL MEDICINE

## 2025-02-24 NOTE — ASSESSMENT & PLAN NOTE
We will obtain a calcium score of the aortic valve.  Will obtain a treadmill stress test to assess for any high risk criteria as he has now experienced symptoms of chest discomfort, albeit with atypical features.  Orders:    Follow Up In Cardiology    ECG 12 lead (Clinic Performed)    CT cardiac scoring wo IV contrast; Future    Follow Up In Cardiology; Future    Stress Test; Future

## 2025-02-24 NOTE — PATIENT INSTRUCTIONS

## 2025-02-24 NOTE — ASSESSMENT & PLAN NOTE
HTN: BP is not well controlled.   We discussed sodium restriction, lifestyle modification, and the DASH diet.  I advised the patient to log blood pressures daily, and to bring the data to the next appointment.  If home BPs are also high, will need to add, or adjust medications.    Risk factor modification: educational materials were provided to the patient.   Orders:    Follow Up In Cardiology    Follow Up In Cardiology; Future

## 2025-02-24 NOTE — PROGRESS NOTES
Chief Complaint:   Please see below.     History Of Present Illness:    Shiva Hanson is a 74 y.o. male presenting with aortic stenosis, episodic sinus pauses, atrial tachycardia with block, nonsustained ventricular tachycardia.    This 74 year old hypertensive, diabetic, hyperlipidemic retired [employer]  is a patient of Dr. Zhou, who advised him to see me because of severe aortic stenosis.     The patient's primary care physician has been following him with serial echocardiography for aortic stenosis.  The patient's most recent echocardiogram in October 2024 disclosed an ejection fraction of 65 to 70% with a V-max across the aortic valve of 4.0 m/s corresponding with peak and mean gradients of 64 and 43 mm respectively.  The aortic valve area was 0.9 cm² with a dimensionless index of 0.26.  Comment was made also on thickening of the mitral valve with moderate mitral annular calcification, 1-2+ mitral regurgitation, 2+ tricuspid regurgitation, and an estimated RV systolic pressure of 34 mm.  At the previous visit, he reported episodic palpitations.  The patient's monitor study done December 21, 2024 to January 19, 2025 disclosed an average heart rate of 54, episodic blocked PACs, a 3-second pause possibly during sleep, and ectopic atrial tachycardia with block as well as a single 5 beat run of nonsustained ventricular tachycardia.  Please see the report for complete details.    Since his last visit, the patient started to experience a discomfort in the chest that feels like a burning in the chest.  These symptoms are not exertional.  There iks no radiation into the neck or jaw or arms or elsewhere.  The chest burning lasts for 60-90 minutes.  He noticed these symptoms started shortly after he started on a higher dose of Ozempic.  He experiences a sour acidy taste in his mouth along with the chest b burning.   The patient denies dyspnea,  orthopnea, PND, syncope, and near syncope.       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    Past Surgical History:  He has a past surgical history that includes Knee arthroscopy w/ debridement (05/28/2014).      Social History:  He reports that he has never smoked. He has never used smokeless tobacco. He reports that he does not drink alcohol and does not use drugs.    Family History:  Family History   Problem Relation Name Age of Onset    Diabetes Mother      Hypertension Father      Other (Hardening of Arteries.) Father      Diabetes Sibling          Allergies:  Patient has no known allergies.    Outpatient Medications:  Current Outpatient Medications   Medication Instructions    allopurinol (ZYLOPRIM) 100 mg, Daily    amLODIPine (NORVASC) 10 mg, oral, Daily    aspirin 81 mg EC tablet 1 tablet, Daily    atenolol (TENORMIN) 25 mg, oral, 2 times daily    atorvastatin (LIPITOR) 40 mg, oral, Nightly    blood sugar diagnostic (OneTouch Verio test strips) " "strip 1 strip, miscellaneous, 3 times daily    cholecalciferol (Vitamin D-3) 5,000 Units tablet 1 tablet, Daily    empagliflozin (JARDIANCE) 10 mg, oral, Daily    hydroCHLOROthiazide (HYDRODIURIL) 25 mg, oral, Daily    insulin aspart (NovoLOG) 100 unit/mL (3 mL) pen 6 units with meals plus up to 10 units per sliding scale    insulin glargine (Lantus) 100 unit/mL (3 mL) pen INJECT  14 UNITS SUBCUTANEOUSLY BEFORE BEDTIME    losartan (COZAAR) 100 mg, oral, Daily    Ozempic 1 mg, subcutaneous, Weekly    pen needle, diabetic 32 gauge x 5/32\" needle 1 per day for E11.9 insulin injections    tamsulosin (Flomax) 0.4 mg 24 hr capsule 1 tab q hs daily       Physical Exam:  GENERAL:  pleasant 74 year-old  HEENT: No xanthelasma  NECK: Supple, no palpable adenopathy or thyromegaly  CHEST: Clear to auscultation, respiratory effort unlabored  CARDIAC: RRR, normal S1 and S2, no audible  rub, gallop, carotids are diminished and delayed., PMI is not displaced.  There is a 3/6 crescendo decrescendo systolic murmur heard best at the RSB, audible over the entire precordium, radiating to the carotids.  ABD: Active bowel sounds, nontender, no organomegaly, no evidence of ascites  EXT: No clubbing, cyanosis, edema, or tenderness  NEURO: Awake, alert, appropriate, speech is fluent       Last Labs:  CBC -  Lab Results   Component Value Date    WBC 10.5 04/03/2024    HGB 16.5 04/03/2024    HCT 50.2 04/03/2024    MCV 93 04/03/2024     04/03/2024       CMP -  Lab Results   Component Value Date    CALCIUM 8.7 01/25/2025    PHOS 3.2 01/25/2025    PROT 6.9 04/03/2024    ALBUMIN 3.9 01/25/2025    AST 14 04/03/2024    ALT 13 04/03/2024    ALKPHOS 89 04/03/2024    BILITOT 0.7 04/03/2024       LIPID PANEL -   Lab Results   Component Value Date    CHOL 111 04/03/2024    TRIG 50 04/03/2024    HDL 43.9 04/03/2024    CHHDL 2.5 04/03/2024    LDLF 62 03/23/2023    VLDL 10 04/03/2024    NHDL 67 04/03/2024       RENAL FUNCTION PANEL -   Lab Results "   Component Value Date    GLUCOSE 164 (H) 01/25/2025     (L) 01/25/2025    K 4.1 01/25/2025     01/25/2025    CO2 23 01/25/2025    ANIONGAP 12 01/25/2025    BUN 46 (H) 01/25/2025    CREATININE 2.16 (H) 01/25/2025    GFRMALE 37 (A) 07/29/2023    CALCIUM 8.7 01/25/2025    PHOS 3.2 01/25/2025    ALBUMIN 3.9 01/25/2025        Lab Results   Component Value Date    HGBA1C 8.1 (A) 01/06/2025         Lab review: I have Chemistry CMP:   Lab Results   Component Value Date    ALBUMIN 3.9 01/25/2025    CALCIUM 8.7 01/25/2025    CO2 23 01/25/2025    CREATININE 2.16 (H) 01/25/2025    GLUCOSE 164 (H) 01/25/2025    BILITOT 0.7 04/03/2024    PROT 6.9 04/03/2024    ALT 13 04/03/2024    AST 14 04/03/2024    ALKPHOS 89 04/03/2024   , Chemistry BMP   Lab Results   Component Value Date    GLUCOSE 164 (H) 01/25/2025    CALCIUM 8.7 01/25/2025    CO2 23 01/25/2025    CREATININE 2.16 (H) 01/25/2025   , CBC:  Lab Results   Component Value Date    WBC 10.5 04/03/2024    RBC 5.38 04/03/2024    HGB 16.5 04/03/2024    HCT 50.2 04/03/2024    MCV 93 04/03/2024    MCH 30.7 04/03/2024    MCHC 32.9 04/03/2024    RDW 13.2 04/03/2024    NRBC 0.0 04/03/2024   , and Lipids:   Lab Results   Component Value Date    CHOL 111 04/03/2024    HDL 43.9 04/03/2024    LDLCALC 57 04/03/2024    TRIG 50 04/03/2024     Diagnostic review: I have independently interpreted the Telemetry .  My findings are as summarized in the HPI and in the report.    Assessment/Plan   Assessment & Plan  Nonrheumatic aortic valve stenosis  We will obtain a calcium score of the aortic valve.  Will obtain a treadmill stress test to assess for any high risk criteria as he has now experienced symptoms of chest discomfort, albeit with atypical features.  Orders:    Follow Up In Cardiology    ECG 12 lead (Clinic Performed)    CT cardiac scoring wo IV contrast; Future    Follow Up In Cardiology; Future    Stress Test; Future    Palpitations    Orders:    Follow Up In  Cardiology    Primary hypertension  HTN: BP is not well controlled.   We discussed sodium restriction, lifestyle modification, and the DASH diet.  I advised the patient to log blood pressures daily, and to bring the data to the next appointment.  If home BPs are also high, will need to add, or adjust medications.    Risk factor modification: educational materials were provided to the patient.   Orders:    Follow Up In Cardiology    Follow Up In Cardiology; Future    Dyslipidemia    Orders:    Follow Up In Cardiology    Follow Up In Cardiology; Future    Sinus pause    Orders:    Follow Up In Cardiology; Future    Referral to Cardiac Electrophysiology; Future    Nonsustained ventricular tachycardia (Multi)    Orders:    Follow Up In Cardiology; Future    Referral to Cardiac Electrophysiology; Future    Burning chest pain    Orders:    Follow Up In Cardiology; Future    Stress Test; Future    Atrial tachycardia (CMS-HCC)               Javier Olivas MD

## 2025-03-05 ENCOUNTER — APPOINTMENT (OUTPATIENT)
Dept: CARDIOLOGY | Facility: HOSPITAL | Age: 75
End: 2025-03-05
Payer: MEDICARE

## 2025-03-12 ENCOUNTER — HOSPITAL ENCOUNTER (OUTPATIENT)
Dept: CARDIOLOGY | Facility: HOSPITAL | Age: 75
Discharge: HOME | End: 2025-03-12
Payer: MEDICARE

## 2025-03-12 DIAGNOSIS — R07.89 BURNING CHEST PAIN: ICD-10-CM

## 2025-03-12 DIAGNOSIS — I35.0 NONRHEUMATIC AORTIC VALVE STENOSIS: ICD-10-CM

## 2025-03-12 PROCEDURE — 93016 CV STRESS TEST SUPVJ ONLY: CPT | Performed by: INTERNAL MEDICINE

## 2025-03-12 PROCEDURE — 93017 CV STRESS TEST TRACING ONLY: CPT

## 2025-03-12 PROCEDURE — 93018 CV STRESS TEST I&R ONLY: CPT | Performed by: INTERNAL MEDICINE

## 2025-04-02 ENCOUNTER — APPOINTMENT (OUTPATIENT)
Dept: CARDIOLOGY | Facility: CLINIC | Age: 75
End: 2025-04-02
Payer: MEDICARE

## 2025-04-04 ENCOUNTER — APPOINTMENT (OUTPATIENT)
Dept: PRIMARY CARE | Facility: CLINIC | Age: 75
End: 2025-04-04
Payer: MEDICARE

## 2025-04-04 VITALS
TEMPERATURE: 97.5 F | WEIGHT: 194 LBS | HEART RATE: 67 BPM | DIASTOLIC BLOOD PRESSURE: 70 MMHG | SYSTOLIC BLOOD PRESSURE: 128 MMHG | HEIGHT: 68 IN | BODY MASS INDEX: 29.4 KG/M2 | RESPIRATION RATE: 16 BRPM | OXYGEN SATURATION: 99 %

## 2025-04-04 DIAGNOSIS — Z00.00 MEDICARE ANNUAL WELLNESS VISIT, SUBSEQUENT: Primary | ICD-10-CM

## 2025-04-04 DIAGNOSIS — E11.9 TYPE 2 DIABETES MELLITUS WITHOUT COMPLICATION, WITH LONG-TERM CURRENT USE OF INSULIN: ICD-10-CM

## 2025-04-04 DIAGNOSIS — Z79.4 TYPE 2 DIABETES MELLITUS WITHOUT COMPLICATION, WITH LONG-TERM CURRENT USE OF INSULIN: ICD-10-CM

## 2025-04-04 DIAGNOSIS — N18.32 CHRONIC KIDNEY DISEASE, STAGE 3B (MULTI): ICD-10-CM

## 2025-04-04 DIAGNOSIS — Z12.5 PROSTATE CANCER SCREENING: ICD-10-CM

## 2025-04-04 RX ORDER — ATORVASTATIN CALCIUM 40 MG/1
40 TABLET, FILM COATED ORAL NIGHTLY
Qty: 90 TABLET | Refills: 3 | Status: SHIPPED | OUTPATIENT
Start: 2025-04-04

## 2025-04-04 RX ORDER — HYDROCHLOROTHIAZIDE 25 MG/1
25 TABLET ORAL DAILY
Qty: 90 TABLET | Refills: 3 | Status: SHIPPED | OUTPATIENT
Start: 2025-04-04

## 2025-04-04 RX ORDER — LOSARTAN POTASSIUM 100 MG/1
100 TABLET ORAL DAILY
Qty: 90 TABLET | Refills: 3 | Status: SHIPPED | OUTPATIENT
Start: 2025-04-04

## 2025-04-04 RX ORDER — INSULIN GLARGINE 100 [IU]/ML
INJECTION, SOLUTION SUBCUTANEOUS
Qty: 3 ML | Refills: 2 | Status: SHIPPED | OUTPATIENT
Start: 2025-04-04

## 2025-04-04 RX ORDER — AMLODIPINE BESYLATE 10 MG/1
10 TABLET ORAL DAILY
Qty: 90 TABLET | Refills: 3 | Status: SHIPPED | OUTPATIENT
Start: 2025-04-04

## 2025-04-04 ASSESSMENT — ACTIVITIES OF DAILY LIVING (ADL)
GROCERY_SHOPPING: INDEPENDENT
BATHING: INDEPENDENT
TAKING_MEDICATION: INDEPENDENT
DRESSING: INDEPENDENT
DOING_HOUSEWORK: INDEPENDENT
MANAGING_FINANCES: INDEPENDENT

## 2025-04-04 ASSESSMENT — ENCOUNTER SYMPTOMS
CONSTIPATION: 0
LIGHT-HEADEDNESS: 0
ABDOMINAL PAIN: 0
NECK PAIN: 0
DIARRHEA: 0
DIZZINESS: 0

## 2025-04-04 ASSESSMENT — PATIENT HEALTH QUESTIONNAIRE - PHQ9
SUM OF ALL RESPONSES TO PHQ9 QUESTIONS 1 AND 2: 0
2. FEELING DOWN, DEPRESSED OR HOPELESS: NOT AT ALL
1. LITTLE INTEREST OR PLEASURE IN DOING THINGS: NOT AT ALL

## 2025-04-04 NOTE — PROGRESS NOTES
"Patient here for a medicare wellness visit and follow up    Subjective   Patient ID: hSiva Hanson is a 74 y.o. male who presents for Follow-up and Medicare Annual Wellness Visit Subsequent.    The patient recently established with  from Cardiology, and is pleased with the care he received.  He completed a Stress Test in 3/2025 which showed no evidence of ischemia.  The patient also underwent Holter Monitoring which showed one incident of a 3 second pause, and has a pending referral for Electrophysiology with .    The patient recently increased semaglutide to 1mg once weekly, and has since noticed some difficulties with focus.  Nevertheless, he states that adverse effects are tolerable to date.    The patient notes occasional \"pulsations\" in the area of the neck which only occur when lays down at night time.  He denies any neck pain, dizziness or lightheadedness.    The patient denies any hearing impairment or vision changes.  He denies any abdominal pain or bowel problems.       Review of Systems   HENT:  Negative for hearing loss.    Gastrointestinal:  Negative for abdominal pain, constipation and diarrhea.   Musculoskeletal:  Negative for neck pain.   Neurological:  Negative for dizziness and light-headedness.       Objective   Physical Exam  Constitutional:       Appearance: Normal appearance.   Neck:      Vascular: No carotid bruit.   Cardiovascular:      Rate and Rhythm: Normal rate and regular rhythm.      Heart sounds: Normal heart sounds.   Pulmonary:      Effort: Pulmonary effort is normal.      Breath sounds: Normal breath sounds.   Abdominal:      General: Bowel sounds are normal.      Palpations: Abdomen is soft.      Tenderness: There is no abdominal tenderness.   Skin:     General: Skin is warm and dry.   Neurological:      General: No focal deficit present.      Mental Status: He is alert and oriented to person, place, and time. Mental status is at baseline.   Psychiatric:         " Mood and Affect: Mood normal.         Behavior: Behavior normal.         Assessment/Plan   Problem List Items Addressed This Visit             ICD-10-CM    Chronic kidney disease, stage 3b (Multi) - Primary N18.32    Relevant Orders    Uric acid    Diabetes mellitus type 2, uncomplicated E11.9    Relevant Medications    amLODIPine (Norvasc) 10 mg tablet    atorvastatin (Lipitor) 40 mg tablet    empagliflozin (Jardiance) 10 mg tablet    hydroCHLOROthiazide (HYDRODiuril) 25 mg tablet    insulin glargine (Lantus) 100 unit/mL (3 mL) pen    losartan (Cozaar) 100 mg tablet    Other Relevant Orders    Lipid panel    CBC and Auto Differential    Comprehensive metabolic panel    Hemoglobin A1c     Other Visit Diagnoses         Codes    Prostate cancer screening     Z12.5    Relevant Orders    Prostate Spec.Ag,Screen            Medicare Wellness Examination Done  -  Discussed healthy diet and regular exercise.    -  Physical exam overall unremarkable. Immunizations reviewed and updated accordingly. Healthy lifestyle choices discussed (tobacco avoidance, appropriate alcohol use, avoidance of illicit substances).   -  Patient is wearing seatbelt.   -  Screening lab work ordered as indicated.    -  Age appropriate screening tests reviewed with patient.       IMPRESSION/PLAN:       DM II   - Last a1c 8.1% - 1/2025.  Maintained on semaglutide 1mg once weekly, Jardiance 10mg QD, NovoLog FlexPen 100 unit/mL 6 units with meals + sliding scale, and Lantus insulin 100 unit/mL 14 units once daily. Previously on metformin 500mg BID.  Previously following with . Ordered HbA1c.    HTN   - /70 in the home today. Currently maintained on Losartan 100mg QD, atenolol 25mg BID, HCTZ 25mg QD, and amlodipine 10mg QD.      HLD   - Stable, continue on atorvastatin 40mg QD.     Aortic Valve Calcification   - Repeat Echo 10/2024 showed normal LVSF with EF 65-70%, LA mildly dilated, and severe AV stenosis.  Last Stress Test 3/2025  showed no evidence of ischemia.  Discussed pathophysiology and implications in detail with patient and advised he call the clinic if he experiences lightheadedness, dizziness, dyspnea, or chest pain.  Recommended Mediterranean diet along with regular physical activity.  Following Cardiology with .    CKD III   - Following with Dr. Kiser in Nephrology, BUN elevated at 26 per 7/2023 RFP and creatinine elevated at 1.97 with GFR decreased at 35. Continue with allopurinol 100mg every day.  Patient will try to set up appointment with , and if unable to do so, will refer to  in Nephrology.     BPH   - Last PSA in normal range - 3/4/2024.  Continue with tamsulosin 0.4mg every day.     Vitamin D Deficiency   - Takes Vitamin D3 125mcg QD.      Health Maintenance   - Routine labs ordered including CBC, CMP, and a lipid panel to be completed in the fasting state. Added PSA, Uric Acid.  Blood type O positive. Last PSA wnl 4/2024. Cologuard negative 7/2024, repeat due 7/2027.       Follow up in 6 months, call sooner if needed.       Scribe Attestation  By signing my name below, I, Shane Danielson   attest that this documentation has been prepared under the direction and in the presence of Med Zhou DO.   Danii Michaud 04/04/25 8:30 AM

## 2025-04-12 ENCOUNTER — HOSPITAL ENCOUNTER (OUTPATIENT)
Dept: RADIOLOGY | Facility: HOSPITAL | Age: 75
Discharge: HOME | End: 2025-04-12
Payer: MEDICARE

## 2025-04-12 DIAGNOSIS — I35.0 NONRHEUMATIC AORTIC VALVE STENOSIS: ICD-10-CM

## 2025-04-12 PROCEDURE — 75571 CT HRT W/O DYE W/CA TEST: CPT

## 2025-04-28 ENCOUNTER — APPOINTMENT (OUTPATIENT)
Dept: CARDIOLOGY | Facility: CLINIC | Age: 75
End: 2025-04-28
Payer: MEDICARE

## 2025-04-28 VITALS
WEIGHT: 194 LBS | HEART RATE: 64 BPM | HEIGHT: 68 IN | BODY MASS INDEX: 29.4 KG/M2 | DIASTOLIC BLOOD PRESSURE: 76 MMHG | SYSTOLIC BLOOD PRESSURE: 154 MMHG

## 2025-04-28 DIAGNOSIS — I10 PRIMARY HYPERTENSION: ICD-10-CM

## 2025-04-28 DIAGNOSIS — R07.89 BURNING CHEST PAIN: ICD-10-CM

## 2025-04-28 DIAGNOSIS — I47.29 NONSUSTAINED VENTRICULAR TACHYCARDIA (MULTI): ICD-10-CM

## 2025-04-28 DIAGNOSIS — I35.0 NONRHEUMATIC AORTIC VALVE STENOSIS: ICD-10-CM

## 2025-04-28 DIAGNOSIS — I45.5 SINUS PAUSE: ICD-10-CM

## 2025-04-28 DIAGNOSIS — E78.5 DYSLIPIDEMIA: ICD-10-CM

## 2025-04-28 PROCEDURE — 3078F DIAST BP <80 MM HG: CPT | Performed by: INTERNAL MEDICINE

## 2025-04-28 PROCEDURE — 1036F TOBACCO NON-USER: CPT | Performed by: INTERNAL MEDICINE

## 2025-04-28 PROCEDURE — 3052F HG A1C>EQUAL 8.0%<EQUAL 9.0%: CPT | Performed by: INTERNAL MEDICINE

## 2025-04-28 PROCEDURE — 4010F ACE/ARB THERAPY RXD/TAKEN: CPT | Performed by: INTERNAL MEDICINE

## 2025-04-28 PROCEDURE — 3062F POS MACROALBUMINURIA REV: CPT | Performed by: INTERNAL MEDICINE

## 2025-04-28 PROCEDURE — 3077F SYST BP >= 140 MM HG: CPT | Performed by: INTERNAL MEDICINE

## 2025-04-28 PROCEDURE — G2211 COMPLEX E/M VISIT ADD ON: HCPCS | Performed by: INTERNAL MEDICINE

## 2025-04-28 PROCEDURE — 99215 OFFICE O/P EST HI 40 MIN: CPT | Performed by: INTERNAL MEDICINE

## 2025-04-28 PROCEDURE — 3008F BODY MASS INDEX DOCD: CPT | Performed by: INTERNAL MEDICINE

## 2025-04-28 PROCEDURE — 1159F MED LIST DOCD IN RCRD: CPT | Performed by: INTERNAL MEDICINE

## 2025-04-28 NOTE — ASSESSMENT & PLAN NOTE
The patient has severe aortic stenosis by echo Doppler as well as by calcium score.  He is functionally class I since his last visit, and had a normal stress test with no high risk features at 7 METS of exercise in February 2025.    Very likely, she will need valve replacement in the the near future.  We discussed this in detail.  We discussed the natural history of aortic stenosis based on onset of symptoms relative to severity of disease, and subsequent prognosis.  We discussed the do's and don'ts of physical activity and exertion.   I advised the patient to contact me with any change in symptoms, as we discussed.    Orders:    Follow Up In Cardiology    Follow Up In Cardiology; Future    Transthoracic Echo Complete; Future

## 2025-04-28 NOTE — PROGRESS NOTES
Chief Complaint:   Please see below.     History Of Present Illness:    Shiva Hanson is a 74 y.o. male presenting with aortic stenosis, episodic sinus pauses, atrial tachycardia with block, nonsustained ventricular tachycardia     This 74 year old hypertensive, diabetic, hyperlipidemic retired [employer]  is a patient of Dr. Zhou, who advised him to see me because of severe aortic stenosis.     The patient's primary care physician has been following him with serial echocardiography for aortic stenosis.  The patient's most recent echocardiogram in October 2024 disclosed an ejection fraction of 65 to 70% with a V-max across the aortic valve of 4.0 m/s corresponding with peak and mean gradients of 64 and 43 mm respectively.  The aortic valve area was 0.9 cm² with a dimensionless index of 0.26.  Comment was made also on thickening of the mitral valve with moderate mitral annular calcification, 1-2+ mitral regurgitation, 2+ tricuspid regurgitation, and an estimated RV systolic pressure of 34 mm.  At a  previous visit, he reported episodic palpitations.  The patient's monitor study done December 21, 2024 to January 19, 2025 disclosed an average heart rate of 54, episodic blocked PACs, a 3-second pause possibly during sleep, and ectopic atrial tachycardia with block as well as a single 5 beat run of nonsustained ventricular tachycardia.  He will be seeing EP in the near future.    His previously described episodes of chest discomfort , discussed at his visit in February 2025 which have resolved.  The patient denies chest discomfort, dyspnea, palpitations, orthopnea, PND, syncope, and near syncope.  He has been doing yardwork, light landscaping, trimming bushes etc, without cardiac symptoms  He is able to walk about 1/4 mile before having to stop due to a numbness in his calves.     The patient's stress test on 3/12/2025 disclosed a hypertensive BP response to exercise, with no evidence of ischemia, or high risk  "features at 7.0 METS.    The patient's  calcium score of the aortic valve on 4/14/2025 was 2576, consistent with severe aortic stenosis .         He will be seeing EP in a few months.          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    Past Surgical History:  He has a past surgical history that includes Knee arthroscopy w/ debridement (05/28/2014).      Social History:  He reports that he has never smoked. He has never used smokeless tobacco. He reports that he does not drink alcohol and does not use drugs.    Family History:  Family History[1]     Allergies:  Patient has no known allergies.    Outpatient Medications:  Current Outpatient Medications   Medication Instructions    allopurinol (ZYLOPRIM) 100 mg, Daily    amLODIPine (NORVASC) 10 mg, oral, Daily    aspirin 81 mg EC tablet 1 tablet, Daily    atenolol (TENORMIN) 25 mg, oral, 2 times daily    atorvastatin (LIPITOR) 40 mg, oral, Nightly    blood sugar diagnostic " "(OneTouch Verio test strips) strip 1 strip, miscellaneous, 3 times daily    cholecalciferol (Vitamin D-3) 5,000 Units tablet 1 tablet, Daily    empagliflozin (JARDIANCE) 10 mg, oral, Daily    hydroCHLOROthiazide (HYDRODIURIL) 25 mg, oral, Daily    insulin aspart (NovoLOG) 100 unit/mL (3 mL) pen 6 units with meals plus up to 10 units per sliding scale    insulin glargine (Lantus) 100 unit/mL (3 mL) pen INJECT  14 UNITS SUBCUTANEOUSLY BEFORE BEDTIME    losartan (COZAAR) 100 mg, oral, Daily    Ozempic 1 mg, subcutaneous, Weekly    pen needle, diabetic 32 gauge x 5/32\" needle 1 per day for E11.9 insulin injections    tamsulosin (Flomax) 0.4 mg 24 hr capsule 1 tab q hs daily       Physical Exam:  GENERAL:  pleasant 74 year-old  HEENT: No xanthelasma  NECK: Supple, no palpable adenopathy or thyromegaly  CHEST: Clear to auscultation, respiratory effort unlabored  CARDIAC: RRR, normal S1 and S2, no audible, rub, gallop, carotids are bilaterally diminished and delayed, PMI is not displaced; there is a 3/6 crescendo decrescendo murmur heard best at the RSB, audible over the entire precordium.  ABD: Active bowel sounds, nontender, no organomegaly, no evidence of ascites  EXT: No clubbing, cyanosis, edema, or tenderness  NEURO: Awake, alert, appropriate, speech is fluent       Last Labs:  CBC -  Lab Results   Component Value Date    WBC 10.5 04/03/2024    HGB 16.5 04/03/2024    HCT 50.2 04/03/2024    MCV 93 04/03/2024     04/03/2024       CMP -  Lab Results   Component Value Date    CALCIUM 8.7 01/25/2025    PHOS 3.2 01/25/2025    PROT 6.9 04/03/2024    ALBUMIN 3.9 01/25/2025    AST 14 04/03/2024    ALT 13 04/03/2024    ALKPHOS 89 04/03/2024    BILITOT 0.7 04/03/2024       LIPID PANEL -   Lab Results   Component Value Date    CHOL 111 04/03/2024    TRIG 50 04/03/2024    HDL 43.9 04/03/2024    CHHDL 2.5 04/03/2024    LDLF 62 03/23/2023    VLDL 10 04/03/2024    NHDL 67 04/03/2024       RENAL FUNCTION PANEL -   Lab Results "   Component Value Date    GLUCOSE 164 (H) 01/25/2025     (L) 01/25/2025    K 4.1 01/25/2025     01/25/2025    CO2 23 01/25/2025    ANIONGAP 12 01/25/2025    BUN 46 (H) 01/25/2025    CREATININE 2.16 (H) 01/25/2025    GFRMALE 37 (A) 07/29/2023    CALCIUM 8.7 01/25/2025    PHOS 3.2 01/25/2025    ALBUMIN 3.9 01/25/2025        Lab Results   Component Value Date    HGBA1C 8.1 (A) 01/06/2025         Lab review: I have Chemistry CMP:   Lab Results   Component Value Date    ALBUMIN 3.9 01/25/2025    CALCIUM 8.7 01/25/2025    CO2 23 01/25/2025    CREATININE 2.16 (H) 01/25/2025    GLUCOSE 164 (H) 01/25/2025    BILITOT 0.7 04/03/2024    PROT 6.9 04/03/2024    ALT 13 04/03/2024    AST 14 04/03/2024    ALKPHOS 89 04/03/2024   , Chemistry BMP   Lab Results   Component Value Date    GLUCOSE 164 (H) 01/25/2025    CALCIUM 8.7 01/25/2025    CO2 23 01/25/2025    CREATININE 2.16 (H) 01/25/2025   , CBC:  Lab Results   Component Value Date    WBC 10.5 04/03/2024    RBC 5.38 04/03/2024    HGB 16.5 04/03/2024    HCT 50.2 04/03/2024    MCV 93 04/03/2024    MCH 30.7 04/03/2024    MCHC 32.9 04/03/2024    RDW 13.2 04/03/2024    NRBC 0.0 04/03/2024   , and Lipids:   Lab Results   Component Value Date    CHOL 111 04/03/2024    HDL 43.9 04/03/2024    LDLCALC 57 04/03/2024    TRIG 50 04/03/2024     Diagnostic review: I have independently interpreted the stress test and calcium score of the aortic valve.  My findings are please see the HPI for complete details..    Assessment/Plan   Assessment & Plan  Nonrheumatic aortic valve stenosis  The patient has severe aortic stenosis by echo Doppler as well as by calcium score.  He is functionally class I since his last visit, and had a normal stress test with no high risk features at 7 METS of exercise in February 2025.    Very likely, she will need valve replacement in the the near future.  We discussed this in detail.  We discussed the natural history of aortic stenosis based on onset of  symptoms relative to severity of disease, and subsequent prognosis.  We discussed the do's and don'ts of physical activity and exertion.   I advised the patient to contact me with any change in symptoms, as we discussed.    Orders:    Follow Up In Cardiology    Follow Up In Cardiology; Future    Transthoracic Echo Complete; Future    Primary hypertension  HTN: BP is not well controlled.   We discussed sodium restriction, lifestyle modification, and the DASH diet.  I advised the patient to log blood pressures daily, and to bring the data to the next appointment.  If home BPs are also high, will need to add, or adjust medications.    Orders:    Follow Up In Cardiology    Follow Up In Cardiology; Future    Dyslipidemia  Risk factor modification: educational materials were provided to the patient.     Orders:    Follow Up In Cardiology    Follow Up In Cardiology; Future    Sinus pause    Orders:    Follow Up In Cardiology    Follow Up In Cardiology; Future    Nonsustained ventricular tachycardia (Multi)    Orders:    Follow Up In Cardiology    Follow Up In Cardiology; Future    Burning chest pain    Orders:    Follow Up In Cardiology          Javier Olivas MD         [1]   Family History  Problem Relation Name Age of Onset    Diabetes Mother      Hypertension Father      Other (Hardening of Arteries.) Father      Diabetes Sibling

## 2025-04-28 NOTE — ASSESSMENT & PLAN NOTE
HTN: BP is not well controlled.   We discussed sodium restriction, lifestyle modification, and the DASH diet.  I advised the patient to log blood pressures daily, and to bring the data to the next appointment.  If home BPs are also high, will need to add, or adjust medications.    Orders:    Follow Up In Cardiology    Follow Up In Cardiology; Future

## 2025-04-30 NOTE — PATIENT INSTRUCTIONS

## 2025-07-16 PROBLEM — H04.129 TEAR FILM INSUFFICIENCY: Status: ACTIVE | Noted: 2025-07-16

## 2025-07-16 PROBLEM — H25.811 COMBINED FORMS OF AGE-RELATED CATARACT OF RIGHT EYE: Status: ACTIVE | Noted: 2025-07-16

## 2025-07-16 PROBLEM — D75.1 SECONDARY POLYCYTHEMIA: Status: ACTIVE | Noted: 2025-07-16

## 2025-07-16 PROBLEM — Z86.69 HISTORY OF CATARACT: Status: RESOLVED | Noted: 2023-02-13 | Resolved: 2025-07-16

## 2025-07-16 PROBLEM — H25.812 COMBINED FORMS OF AGE-RELATED CATARACT OF LEFT EYE: Status: ACTIVE | Noted: 2025-07-16

## 2025-07-16 PROBLEM — H02.889 MEIBOMIAN GLAND DYSFUNCTION (MGD): Status: ACTIVE | Noted: 2025-07-16

## 2025-07-16 PROBLEM — E11.3293 TYPE 2 DIABETES MELLITUS WITH MILD NONPROLIFERATIVE RETINOPATHY OF BOTH EYES WITHOUT MACULAR EDEMA: Status: ACTIVE | Noted: 2025-07-16

## 2025-07-16 PROBLEM — H52.00 HYPEROPIA: Status: ACTIVE | Noted: 2025-07-16

## 2025-07-16 PROBLEM — H04.123 DRY EYE SYNDROME OF BOTH EYES: Status: ACTIVE | Noted: 2025-07-16

## 2025-07-16 PROBLEM — M54.16 LUMBAR RADICULOPATHY: Status: ACTIVE | Noted: 2025-07-16

## 2025-07-16 PROBLEM — G47.30 SLEEP APNEA: Status: ACTIVE | Noted: 2025-07-16

## 2025-07-16 PROBLEM — H43.823 VITREOMACULAR TRACTION SYNDROME OF BOTH EYES: Status: ACTIVE | Noted: 2023-05-01

## 2025-07-16 PROBLEM — E66.811 CLASS 1 OBESITY WITH BODY MASS INDEX (BMI) OF 31.0 TO 31.9 IN ADULT: Status: RESOLVED | Noted: 2023-02-13 | Resolved: 2025-07-16

## 2025-08-06 PROBLEM — G47.30 SLEEP APNEA: Status: RESOLVED | Noted: 2025-07-16 | Resolved: 2025-08-06

## 2025-08-06 PROBLEM — H04.123 DRY EYE SYNDROME OF BOTH EYES: Status: RESOLVED | Noted: 2025-07-16 | Resolved: 2025-08-06

## 2025-08-06 PROBLEM — H02.889 MEIBOMIAN GLAND DYSFUNCTION (MGD): Status: RESOLVED | Noted: 2025-07-16 | Resolved: 2025-08-06

## 2025-08-06 PROBLEM — H04.129 TEAR FILM INSUFFICIENCY: Status: RESOLVED | Noted: 2025-07-16 | Resolved: 2025-08-06

## 2025-08-06 PROBLEM — M54.16 LUMBAR RADICULOPATHY: Status: RESOLVED | Noted: 2025-07-16 | Resolved: 2025-08-06

## 2025-08-06 PROBLEM — E11.3293 TYPE 2 DIABETES MELLITUS WITH MILD NONPROLIFERATIVE RETINOPATHY OF BOTH EYES WITHOUT MACULAR EDEMA: Status: RESOLVED | Noted: 2025-07-16 | Resolved: 2025-08-06

## 2025-08-07 ENCOUNTER — APPOINTMENT (OUTPATIENT)
Dept: CARDIOLOGY | Facility: CLINIC | Age: 75
End: 2025-08-07
Payer: MEDICARE

## 2025-08-07 VITALS
HEART RATE: 57 BPM | WEIGHT: 190.8 LBS | HEIGHT: 68 IN | OXYGEN SATURATION: 96 % | DIASTOLIC BLOOD PRESSURE: 68 MMHG | BODY MASS INDEX: 28.92 KG/M2 | SYSTOLIC BLOOD PRESSURE: 140 MMHG

## 2025-08-07 DIAGNOSIS — I10 PRIMARY HYPERTENSION: ICD-10-CM

## 2025-08-07 DIAGNOSIS — I35.0 SEVERE AORTIC STENOSIS: ICD-10-CM

## 2025-08-07 DIAGNOSIS — I47.29 NONSUSTAINED VENTRICULAR TACHYCARDIA (MULTI): ICD-10-CM

## 2025-08-07 DIAGNOSIS — I45.5 SINUS PAUSE: Primary | ICD-10-CM

## 2025-08-07 DIAGNOSIS — E78.5 DYSLIPIDEMIA: ICD-10-CM

## 2025-08-07 NOTE — PROGRESS NOTES
Cardiac Electrophysiology Office Visit     Referred by Javier Abebe MD for No chief complaint on file.      Shiva Hanson is a 75 y.o. year old male patient with h/o Severe Aortic Stenosis, NSVT, HTN, Sinus pauses, T2DM presenting today to establish care    History of Present Illness  Shiva Hanson is a 75-year-old male with aortic stenosis who presents for evaluation of his heart valve condition. He was referred by Dr. Lawrence for evaluation of his heart valve condition.    He has a history of aortic stenosis, initially identified two years ago through an ultrasound that showed narrowing of the aortic valve. A recent echocardiogram in October 2024 showed a dimensionless index of 0.26.    A stress test in March 2025 showed good heart rate response to exercise, reaching a maximum of 157 bpm, and good recovery. A 30-day heart monitor from December 2024 to January 2025 revealed an average heart rate of 54 bpm with sinus rhythm, one episode of non-sustained ventricular tachycardia, and some pauses during sleep, which he attributes to possible monitor misplacement while sleeping on his side.    He has type 2 diabetes and is experiencing neuropathy in his legs, which he feels contributes to his reduced activity level. He is attempting to follow a Mediterranean diet, increase vegetable intake, reduce coffee consumption, and drink more water to manage his diabetes and blood pressure.    No symptoms of shortness of breath, lightheadedness, or dizziness, but he notes a general slowness in activity, which he attributes to a combination of his heart condition, diabetes, and reduced activity during hot weather. He continues to perform light yard work but avoids strenuous activities like snow shoveling.    Current medications include atenolol 25 mg twice daily, amlodipine 10 mg daily, hydrochlorothiazide 25 mg daily, and atorvastatin 40 mg daily. He monitors his blood pressure at home.    Objective  Current  "Outpatient Medications   Medication Instructions    allopurinol (ZYLOPRIM) 100 mg, Daily    amLODIPine (NORVASC) 10 mg, oral, Daily    aspirin 81 mg EC tablet 1 tablet, Daily    atenolol (TENORMIN) 25 mg, oral, 2 times daily    atorvastatin (LIPITOR) 40 mg, oral, Nightly    blood sugar diagnostic (OneTouch Verio test strips) strip 1 strip, miscellaneous, 3 times daily    cholecalciferol (Vitamin D-3) 5,000 Units tablet 1 tablet, Daily    empagliflozin (JARDIANCE) 10 mg, oral, Daily    hydroCHLOROthiazide (HYDRODIURIL) 25 mg, oral, Daily    insulin aspart (NovoLOG) 100 unit/mL (3 mL) pen 6 units with meals plus up to 10 units per sliding scale    insulin glargine (Lantus) 100 unit/mL (3 mL) pen INJECT  14 UNITS SUBCUTANEOUSLY BEFORE BEDTIME    losartan (COZAAR) 100 mg, oral, Daily    Ozempic 1 mg, subcutaneous, Weekly    pen needle, diabetic 32 gauge x 5/32\" needle 1 per day for E11.9 insulin injections    tamsulosin (Flomax) 0.4 mg 24 hr capsule 1 tab q hs daily         Visit Vitals  /68 (BP Location: Left arm, Patient Position: Sitting)   Pulse 57   Ht 1.715 m (5' 7.5\")   Wt 86.5 kg (190 lb 12.8 oz)   SpO2 96%   BMI 29.44 kg/m²   Smoking Status Never   BSA 2.03 m²      Physical Exam  Vitals reviewed.   Constitutional:       Appearance: Normal appearance.   HENT:      Head: Normocephalic.     Cardiovascular:      Rate and Rhythm: Normal rate and regular rhythm.      Heart sounds: Murmur heard.      Crescendo systolic murmur is present with a grade of 3/6.   Pulmonary:      Effort: Pulmonary effort is normal. No respiratory distress.      Breath sounds: No wheezing.     Skin:     General: Skin is warm and dry.      Capillary Refill: Capillary refill takes less than 2 seconds.     Neurological:      Mental Status: He is alert.     Psychiatric:         Mood and Affect: Mood normal.           My Interpretation of Reviewed Study(s):  Results  Echocardiogram (01/2023): Normal heart function, EF 60-65%, moderate " aortic valve thickening, calcification, moderate aortic stenosis.    Echocardiogram (10/2024): Heart function EF 65-70%, severely stenotic aortic valve, aortic valve dimensionless index 0.26.    30-day cardiac monitor (12/2024-01/2025): Average heart rate 54 bpm, sinus rhythm, one episode of ventricular tachycardia, pauses during sleep.    Exercise stress test (03/2025): Average functional capacity 7 METs, heart rate increased with activity, good recovery, stage 1 heart rate 117 bpm, stage 2 heart rate 157 bpm.       Assessment & Plan  Sinus bradycardia  Sinus bradycardia with an average heart rate of 54 bpm. Pauses during sleep likely positional. No current symptoms of fatigue, shortness of breath, lightheadedness, or dizziness. Heart rate increases appropriately with exercise, indicating no immediate need for a pacemaker. Potential future need for pacemaker if symptoms develop or heart rate decreases further.  - Monitor for symptoms such as worsening shortness of breath, lightheadedness, dizziness, or syncope.  - Monitor heart rate at home; report if heart rate trends to low 50s or high 40s.  - Hold atenolol if heart rate decreases significantly and symptoms develop.    Hypertension  Hypertension managed with amlodipine, hydrochlorothiazide, and atenolol. Blood pressure monitoring at home is ongoing.  - Continue amlodipine 10 mg daily.  - Continue hydrochlorothiazide 25 mg daily.  - Continue atenolol 25 mg twice daily.    Hyperlipidemia  Hyperlipidemia managed with atorvastatin.  - Continue atorvastatin 40 mg daily.    Non-sustained ventricular tachycardia  One episode of non-sustained ventricular tachycardia noted on monitor. Likely related to valve issues or benign.  - No treatment necessary at this time.    Return to Clinic: Patient should return to the [facility] in 6 months    Miguel Hernandez MD [facility]  Cardiac Electrophysiology  [email or web address]    **Disclaimer: This note was dictated by speech  recognition, and every effort has been made to prevent any error in transcription, however minor errors may be present**    This medical note was created with the assistance of artificial intelligence (AI) for documentation purposes. The content has been reviewed and confirmed by the healthcare provider for accuracy and completeness. Patient consented to the use of audio recording and use of AI during their visit.

## 2025-10-03 ENCOUNTER — APPOINTMENT (OUTPATIENT)
Dept: PRIMARY CARE | Facility: CLINIC | Age: 75
End: 2025-10-03
Payer: MEDICARE

## 2025-11-06 ENCOUNTER — APPOINTMENT (OUTPATIENT)
Dept: CARDIOLOGY | Facility: CLINIC | Age: 75
End: 2025-11-06
Payer: MEDICARE

## 2026-02-12 ENCOUNTER — APPOINTMENT (OUTPATIENT)
Dept: CARDIOLOGY | Facility: CLINIC | Age: 76
End: 2026-02-12
Payer: MEDICARE